# Patient Record
Sex: MALE | Race: WHITE | URBAN - METROPOLITAN AREA
[De-identification: names, ages, dates, MRNs, and addresses within clinical notes are randomized per-mention and may not be internally consistent; named-entity substitution may affect disease eponyms.]

---

## 2018-04-14 ENCOUNTER — APPOINTMENT (OUTPATIENT)
Dept: CT IMAGING | Facility: CLINIC | Age: 83
DRG: 637 | End: 2018-04-14
Attending: INTERNAL MEDICINE
Payer: MEDICARE

## 2018-04-14 ENCOUNTER — APPOINTMENT (OUTPATIENT)
Dept: CT IMAGING | Facility: CLINIC | Age: 83
DRG: 637 | End: 2018-04-14
Attending: EMERGENCY MEDICINE
Payer: MEDICARE

## 2018-04-14 ENCOUNTER — ANESTHESIA EVENT (OUTPATIENT)
Dept: MEDSURG UNIT | Facility: CLINIC | Age: 83
DRG: 637 | End: 2018-04-14
Payer: MEDICARE

## 2018-04-14 ENCOUNTER — HOSPITAL ENCOUNTER (INPATIENT)
Facility: CLINIC | Age: 83
LOS: 5 days | Discharge: HOME HEALTH | DRG: 637 | End: 2018-04-19
Attending: EMERGENCY MEDICINE | Admitting: INTERNAL MEDICINE
Payer: MEDICARE

## 2018-04-14 ENCOUNTER — ANESTHESIA (OUTPATIENT)
Dept: MEDSURG UNIT | Facility: CLINIC | Age: 83
DRG: 637 | End: 2018-04-14
Payer: MEDICARE

## 2018-04-14 ENCOUNTER — APPOINTMENT (OUTPATIENT)
Dept: GENERAL RADIOLOGY | Facility: CLINIC | Age: 83
DRG: 637 | End: 2018-04-14
Attending: EMERGENCY MEDICINE
Payer: MEDICARE

## 2018-04-14 DIAGNOSIS — R73.9 HYPERGLYCEMIA: ICD-10-CM

## 2018-04-14 DIAGNOSIS — R30.0 DYSURIA: ICD-10-CM

## 2018-04-14 DIAGNOSIS — E86.0 DEHYDRATION: ICD-10-CM

## 2018-04-14 DIAGNOSIS — N40.1 BENIGN PROSTATIC HYPERPLASIA WITH LOWER URINARY TRACT SYMPTOMS, SYMPTOM DETAILS UNSPECIFIED: ICD-10-CM

## 2018-04-14 DIAGNOSIS — G93.41 METABOLIC ENCEPHALOPATHY: ICD-10-CM

## 2018-04-14 DIAGNOSIS — E11.00 TYPE 2 DIABETES MELLITUS WITH HYPEROSMOLARITY WITHOUT COMA, WITHOUT LONG-TERM CURRENT USE OF INSULIN (H): Primary | ICD-10-CM

## 2018-04-14 DIAGNOSIS — A04.72 C. DIFFICILE COLITIS: ICD-10-CM

## 2018-04-14 LAB
ALBUMIN SERPL-MCNC: 2.8 G/DL (ref 3.4–5)
ALBUMIN SERPL-MCNC: 3.1 G/DL (ref 3.4–5)
ALBUMIN UR-MCNC: 30 MG/DL
ALP SERPL-CCNC: 146 U/L (ref 40–150)
ALP SERPL-CCNC: 165 U/L (ref 40–150)
ALT SERPL W P-5'-P-CCNC: 33 U/L (ref 0–70)
ALT SERPL W P-5'-P-CCNC: 40 U/L (ref 0–70)
ANION GAP SERPL CALCULATED.3IONS-SCNC: 12 MMOL/L (ref 3–14)
ANION GAP SERPL CALCULATED.3IONS-SCNC: 9 MMOL/L (ref 3–14)
APPEARANCE UR: CLEAR
AST SERPL W P-5'-P-CCNC: 11 U/L (ref 0–45)
AST SERPL W P-5'-P-CCNC: 12 U/L (ref 0–45)
BASE EXCESS BLDV CALC-SCNC: 1.2 MMOL/L
BASOPHILS # BLD AUTO: 0.1 10E9/L (ref 0–0.2)
BASOPHILS NFR BLD AUTO: 0.4 %
BILIRUB DIRECT SERPL-MCNC: 0.3 MG/DL (ref 0–0.2)
BILIRUB SERPL-MCNC: 0.9 MG/DL (ref 0.2–1.3)
BILIRUB SERPL-MCNC: 1 MG/DL (ref 0.2–1.3)
BILIRUB UR QL STRIP: NEGATIVE
BUN SERPL-MCNC: 18 MG/DL (ref 7–30)
BUN SERPL-MCNC: 20 MG/DL (ref 7–30)
CALCIUM SERPL-MCNC: 8.5 MG/DL (ref 8.5–10.1)
CALCIUM SERPL-MCNC: 9.3 MG/DL (ref 8.5–10.1)
CHLORIDE SERPL-SCNC: 110 MMOL/L (ref 94–109)
CHLORIDE SERPL-SCNC: 118 MMOL/L (ref 94–109)
CO2 SERPL-SCNC: 22 MMOL/L (ref 20–32)
CO2 SERPL-SCNC: 24 MMOL/L (ref 20–32)
COLOR UR AUTO: ABNORMAL
CREAT SERPL-MCNC: 1.18 MG/DL (ref 0.66–1.25)
CREAT SERPL-MCNC: 1.33 MG/DL (ref 0.66–1.25)
DIFFERENTIAL METHOD BLD: ABNORMAL
EOSINOPHIL # BLD AUTO: 0 10E9/L (ref 0–0.7)
EOSINOPHIL NFR BLD AUTO: 0.2 %
ERYTHROCYTE [DISTWIDTH] IN BLOOD BY AUTOMATED COUNT: 21.8 % (ref 10–15)
ERYTHROCYTE [DISTWIDTH] IN BLOOD BY AUTOMATED COUNT: 22 % (ref 10–15)
GFR SERPL CREATININE-BSD FRML MDRD: 50 ML/MIN/1.7M2
GFR SERPL CREATININE-BSD FRML MDRD: 58 ML/MIN/1.7M2
GLUCOSE BLDC GLUCOMTR-MCNC: 198 MG/DL (ref 70–99)
GLUCOSE BLDC GLUCOMTR-MCNC: 199 MG/DL (ref 70–99)
GLUCOSE BLDC GLUCOMTR-MCNC: 202 MG/DL (ref 70–99)
GLUCOSE BLDC GLUCOMTR-MCNC: 204 MG/DL (ref 70–99)
GLUCOSE BLDC GLUCOMTR-MCNC: 220 MG/DL (ref 70–99)
GLUCOSE SERPL-MCNC: 242 MG/DL (ref 70–99)
GLUCOSE SERPL-MCNC: 439 MG/DL (ref 70–99)
GLUCOSE UR STRIP-MCNC: >1000 MG/DL
HBA1C MFR BLD: 8.8 % (ref 0–6.4)
HCO3 BLDV-SCNC: 25 MMOL/L (ref 21–28)
HCT VFR BLD AUTO: 36.3 % (ref 40–53)
HCT VFR BLD AUTO: 39.4 % (ref 40–53)
HGB BLD-MCNC: 11.4 G/DL (ref 13.3–17.7)
HGB BLD-MCNC: 12.4 G/DL (ref 13.3–17.7)
HGB UR QL STRIP: ABNORMAL
IMM GRANULOCYTES # BLD: 0.1 10E9/L (ref 0–0.4)
IMM GRANULOCYTES NFR BLD: 0.6 %
INTERPRETATION ECG - MUSE: NORMAL
KETONES BLD-SCNC: 1.6 MMOL/L (ref 0–0.6)
KETONES UR STRIP-MCNC: 40 MG/DL
LACTATE BLD-SCNC: 1.9 MMOL/L (ref 0.7–2)
LACTATE BLD-SCNC: 2.9 MMOL/L (ref 0.7–2)
LEUKOCYTE ESTERASE UR QL STRIP: ABNORMAL
LYMPHOCYTES # BLD AUTO: 1.6 10E9/L (ref 0.8–5.3)
LYMPHOCYTES NFR BLD AUTO: 12.4 %
MAGNESIUM SERPL-MCNC: 1.9 MG/DL (ref 1.6–2.3)
MCH RBC QN AUTO: 24.2 PG (ref 26.5–33)
MCH RBC QN AUTO: 24.4 PG (ref 26.5–33)
MCHC RBC AUTO-ENTMCNC: 31.4 G/DL (ref 31.5–36.5)
MCHC RBC AUTO-ENTMCNC: 31.5 G/DL (ref 31.5–36.5)
MCV RBC AUTO: 77 FL (ref 78–100)
MCV RBC AUTO: 78 FL (ref 78–100)
MONOCYTES # BLD AUTO: 1.1 10E9/L (ref 0–1.3)
MONOCYTES NFR BLD AUTO: 8.2 %
NEUTROPHILS # BLD AUTO: 10.4 10E9/L (ref 1.6–8.3)
NEUTROPHILS NFR BLD AUTO: 78.2 %
NITRATE UR QL: NEGATIVE
NRBC # BLD AUTO: 0 10*3/UL
NRBC BLD AUTO-RTO: 0 /100
OSMOLALITY SERPL: 332 MMOL/KG (ref 280–301)
OXYHGB MFR BLDV: 43 %
PCO2 BLDV: 34 MM HG (ref 40–50)
PH BLDV: 7.47 PH (ref 7.32–7.43)
PH UR STRIP: 7.5 PH (ref 5–7)
PLATELET # BLD AUTO: 352 10E9/L (ref 150–450)
PLATELET # BLD AUTO: 400 10E9/L (ref 150–450)
PO2 BLDV: 25 MM HG (ref 25–47)
POTASSIUM SERPL-SCNC: 3.5 MMOL/L (ref 3.4–5.3)
POTASSIUM SERPL-SCNC: 4.2 MMOL/L (ref 3.4–5.3)
PROCALCITONIN SERPL-MCNC: 0.12 NG/ML
PROT SERPL-MCNC: 6.8 G/DL (ref 6.8–8.8)
PROT SERPL-MCNC: 7.7 G/DL (ref 6.8–8.8)
RBC # BLD AUTO: 4.68 10E12/L (ref 4.4–5.9)
RBC # BLD AUTO: 5.12 10E12/L (ref 4.4–5.9)
RBC #/AREA URNS AUTO: 3 /HPF (ref 0–2)
SODIUM SERPL-SCNC: 144 MMOL/L (ref 133–144)
SODIUM SERPL-SCNC: 151 MMOL/L (ref 133–144)
SOURCE: ABNORMAL
SP GR UR STRIP: 1.02 (ref 1–1.03)
SQUAMOUS #/AREA URNS AUTO: 1 /HPF (ref 0–1)
TROPONIN I SERPL-MCNC: <0.015 UG/L (ref 0–0.04)
UROBILINOGEN UR STRIP-MCNC: NORMAL MG/DL (ref 0–2)
WBC # BLD AUTO: 10.6 10E9/L (ref 4–11)
WBC # BLD AUTO: 13.2 10E9/L (ref 4–11)
WBC #/AREA URNS AUTO: 7 /HPF (ref 0–5)

## 2018-04-14 PROCEDURE — 81001 URINALYSIS AUTO W/SCOPE: CPT | Performed by: EMERGENCY MEDICINE

## 2018-04-14 PROCEDURE — 80053 COMPREHEN METABOLIC PANEL: CPT | Performed by: EMERGENCY MEDICINE

## 2018-04-14 PROCEDURE — 25000131 ZZH RX MED GY IP 250 OP 636 PS 637: Performed by: INTERNAL MEDICINE

## 2018-04-14 PROCEDURE — 74176 CT ABD & PELVIS W/O CONTRAST: CPT

## 2018-04-14 PROCEDURE — 80076 HEPATIC FUNCTION PANEL: CPT | Performed by: INTERNAL MEDICINE

## 2018-04-14 PROCEDURE — 00000146 ZZHCL STATISTIC GLUCOSE BY METER IP

## 2018-04-14 PROCEDURE — 71046 X-RAY EXAM CHEST 2 VIEWS: CPT

## 2018-04-14 PROCEDURE — 84484 ASSAY OF TROPONIN QUANT: CPT | Performed by: EMERGENCY MEDICINE

## 2018-04-14 PROCEDURE — 83036 HEMOGLOBIN GLYCOSYLATED A1C: CPT | Performed by: INTERNAL MEDICINE

## 2018-04-14 PROCEDURE — 25000132 ZZH RX MED GY IP 250 OP 250 PS 637: Performed by: INTERNAL MEDICINE

## 2018-04-14 PROCEDURE — 37000011 ZZH ANESTHESIA WARD SERVICE

## 2018-04-14 PROCEDURE — 83735 ASSAY OF MAGNESIUM: CPT | Performed by: INTERNAL MEDICINE

## 2018-04-14 PROCEDURE — 37000011 ZZH ANESTHESIA WARD SERVICE: Performed by: REGISTERED NURSE

## 2018-04-14 PROCEDURE — 85025 COMPLETE CBC W/AUTO DIFF WBC: CPT | Performed by: EMERGENCY MEDICINE

## 2018-04-14 PROCEDURE — 96374 THER/PROPH/DIAG INJ IV PUSH: CPT

## 2018-04-14 PROCEDURE — 70450 CT HEAD/BRAIN W/O DYE: CPT

## 2018-04-14 PROCEDURE — 85027 COMPLETE CBC AUTOMATED: CPT | Performed by: INTERNAL MEDICINE

## 2018-04-14 PROCEDURE — 36415 COLL VENOUS BLD VENIPUNCTURE: CPT

## 2018-04-14 PROCEDURE — 25000128 H RX IP 250 OP 636: Performed by: INTERNAL MEDICINE

## 2018-04-14 PROCEDURE — 87088 URINE BACTERIA CULTURE: CPT | Performed by: EMERGENCY MEDICINE

## 2018-04-14 PROCEDURE — 83605 ASSAY OF LACTIC ACID: CPT | Performed by: EMERGENCY MEDICINE

## 2018-04-14 PROCEDURE — 99285 EMERGENCY DEPT VISIT HI MDM: CPT | Mod: 25

## 2018-04-14 PROCEDURE — 25000128 H RX IP 250 OP 636: Performed by: EMERGENCY MEDICINE

## 2018-04-14 PROCEDURE — 87040 BLOOD CULTURE FOR BACTERIA: CPT | Performed by: EMERGENCY MEDICINE

## 2018-04-14 PROCEDURE — 82805 BLOOD GASES W/O2 SATURATION: CPT | Performed by: EMERGENCY MEDICINE

## 2018-04-14 PROCEDURE — 12000000 ZZH R&B MED SURG/OB

## 2018-04-14 PROCEDURE — 84145 PROCALCITONIN (PCT): CPT | Performed by: INTERNAL MEDICINE

## 2018-04-14 PROCEDURE — 80048 BASIC METABOLIC PNL TOTAL CA: CPT | Performed by: INTERNAL MEDICINE

## 2018-04-14 PROCEDURE — 36415 COLL VENOUS BLD VENIPUNCTURE: CPT | Performed by: EMERGENCY MEDICINE

## 2018-04-14 PROCEDURE — 82010 KETONE BODYS QUAN: CPT | Performed by: EMERGENCY MEDICINE

## 2018-04-14 PROCEDURE — 87186 SC STD MICRODIL/AGAR DIL: CPT | Performed by: EMERGENCY MEDICINE

## 2018-04-14 PROCEDURE — 36415 COLL VENOUS BLD VENIPUNCTURE: CPT | Performed by: INTERNAL MEDICINE

## 2018-04-14 PROCEDURE — 83930 ASSAY OF BLOOD OSMOLALITY: CPT | Performed by: EMERGENCY MEDICINE

## 2018-04-14 PROCEDURE — 25000131 ZZH RX MED GY IP 250 OP 636 PS 637: Performed by: EMERGENCY MEDICINE

## 2018-04-14 PROCEDURE — 87086 URINE CULTURE/COLONY COUNT: CPT | Performed by: EMERGENCY MEDICINE

## 2018-04-14 PROCEDURE — 93005 ELECTROCARDIOGRAM TRACING: CPT

## 2018-04-14 PROCEDURE — 99223 1ST HOSP IP/OBS HIGH 75: CPT | Mod: AI | Performed by: INTERNAL MEDICINE

## 2018-04-14 PROCEDURE — 40000671 ZZH STATISTIC ANESTHESIA CASE

## 2018-04-14 RX ORDER — MAGNESIUM SULFATE HEPTAHYDRATE 40 MG/ML
4 INJECTION, SOLUTION INTRAVENOUS EVERY 4 HOURS PRN
Status: DISCONTINUED | OUTPATIENT
Start: 2018-04-14 | End: 2018-04-19 | Stop reason: HOSPADM

## 2018-04-14 RX ORDER — SODIUM CHLORIDE 9 MG/ML
INJECTION, SOLUTION INTRAVENOUS CONTINUOUS
Status: DISCONTINUED | OUTPATIENT
Start: 2018-04-14 | End: 2018-04-14

## 2018-04-14 RX ORDER — CEFTRIAXONE 1 G/1
1 INJECTION, POWDER, FOR SOLUTION INTRAMUSCULAR; INTRAVENOUS EVERY 24 HOURS
Status: DISCONTINUED | OUTPATIENT
Start: 2018-04-14 | End: 2018-04-15

## 2018-04-14 RX ORDER — BISACODYL 5 MG
5 TABLET, DELAYED RELEASE (ENTERIC COATED) ORAL DAILY PRN
Status: DISCONTINUED | OUTPATIENT
Start: 2018-04-14 | End: 2018-04-19 | Stop reason: HOSPADM

## 2018-04-14 RX ORDER — DEXTROSE MONOHYDRATE 25 G/50ML
25-50 INJECTION, SOLUTION INTRAVENOUS
Status: DISCONTINUED | OUTPATIENT
Start: 2018-04-14 | End: 2018-04-14

## 2018-04-14 RX ORDER — PROCHLORPERAZINE 25 MG
12.5 SUPPOSITORY, RECTAL RECTAL EVERY 12 HOURS PRN
Status: DISCONTINUED | OUTPATIENT
Start: 2018-04-14 | End: 2018-04-19 | Stop reason: HOSPADM

## 2018-04-14 RX ORDER — METOPROLOL TARTRATE 50 MG
50 TABLET ORAL 2 TIMES DAILY
Status: DISCONTINUED | OUTPATIENT
Start: 2018-04-14 | End: 2018-04-15

## 2018-04-14 RX ORDER — SODIUM CHLORIDE AND POTASSIUM CHLORIDE 150; 450 MG/100ML; MG/100ML
INJECTION, SOLUTION INTRAVENOUS CONTINUOUS
Status: DISCONTINUED | OUTPATIENT
Start: 2018-04-14 | End: 2018-04-17

## 2018-04-14 RX ORDER — POTASSIUM CHLORIDE 29.8 MG/ML
20 INJECTION INTRAVENOUS
Status: DISCONTINUED | OUTPATIENT
Start: 2018-04-14 | End: 2018-04-19 | Stop reason: HOSPADM

## 2018-04-14 RX ORDER — NALOXONE HYDROCHLORIDE 0.4 MG/ML
.1-.4 INJECTION, SOLUTION INTRAMUSCULAR; INTRAVENOUS; SUBCUTANEOUS
Status: DISCONTINUED | OUTPATIENT
Start: 2018-04-14 | End: 2018-04-19 | Stop reason: HOSPADM

## 2018-04-14 RX ORDER — DEXTROSE MONOHYDRATE 25 G/50ML
25-50 INJECTION, SOLUTION INTRAVENOUS
Status: DISCONTINUED | OUTPATIENT
Start: 2018-04-14 | End: 2018-04-15

## 2018-04-14 RX ORDER — POTASSIUM CHLORIDE 1500 MG/1
20-40 TABLET, EXTENDED RELEASE ORAL
Status: DISCONTINUED | OUTPATIENT
Start: 2018-04-14 | End: 2018-04-19 | Stop reason: HOSPADM

## 2018-04-14 RX ORDER — ONDANSETRON 2 MG/ML
4 INJECTION INTRAMUSCULAR; INTRAVENOUS EVERY 6 HOURS PRN
Status: DISCONTINUED | OUTPATIENT
Start: 2018-04-14 | End: 2018-04-19 | Stop reason: HOSPADM

## 2018-04-14 RX ORDER — NICOTINE POLACRILEX 4 MG
15-30 LOZENGE BUCCAL
Status: DISCONTINUED | OUTPATIENT
Start: 2018-04-14 | End: 2018-04-15

## 2018-04-14 RX ORDER — POTASSIUM CL/LIDO/0.9 % NACL 10MEQ/0.1L
10 INTRAVENOUS SOLUTION, PIGGYBACK (ML) INTRAVENOUS
Status: DISCONTINUED | OUTPATIENT
Start: 2018-04-14 | End: 2018-04-19 | Stop reason: HOSPADM

## 2018-04-14 RX ORDER — BUDESONIDE AND FORMOTEROL FUMARATE DIHYDRATE 160; 4.5 UG/1; UG/1
2 AEROSOL RESPIRATORY (INHALATION) 2 TIMES DAILY
Status: DISCONTINUED | OUTPATIENT
Start: 2018-04-14 | End: 2018-04-14 | Stop reason: CLARIF

## 2018-04-14 RX ORDER — ROSUVASTATIN CALCIUM 10 MG/1
10 TABLET, COATED ORAL DAILY
COMMUNITY

## 2018-04-14 RX ORDER — POTASSIUM CHLORIDE 1.5 G/1.58G
20-40 POWDER, FOR SOLUTION ORAL
Status: DISCONTINUED | OUTPATIENT
Start: 2018-04-14 | End: 2018-04-19 | Stop reason: HOSPADM

## 2018-04-14 RX ORDER — NICOTINE POLACRILEX 4 MG
15-30 LOZENGE BUCCAL
Status: DISCONTINUED | OUTPATIENT
Start: 2018-04-14 | End: 2018-04-14

## 2018-04-14 RX ORDER — BISACODYL 5 MG
10 TABLET, DELAYED RELEASE (ENTERIC COATED) ORAL DAILY PRN
Status: DISCONTINUED | OUTPATIENT
Start: 2018-04-14 | End: 2018-04-19 | Stop reason: HOSPADM

## 2018-04-14 RX ORDER — IPRATROPIUM BROMIDE AND ALBUTEROL SULFATE 2.5; .5 MG/3ML; MG/3ML
1 SOLUTION RESPIRATORY (INHALATION) EVERY 6 HOURS PRN
Status: DISCONTINUED | OUTPATIENT
Start: 2018-04-14 | End: 2018-04-19 | Stop reason: HOSPADM

## 2018-04-14 RX ORDER — ACETAMINOPHEN 325 MG/1
650 TABLET ORAL EVERY 4 HOURS PRN
Status: DISCONTINUED | OUTPATIENT
Start: 2018-04-14 | End: 2018-04-19 | Stop reason: HOSPADM

## 2018-04-14 RX ORDER — BISACODYL 5 MG
15 TABLET, DELAYED RELEASE (ENTERIC COATED) ORAL DAILY PRN
Status: DISCONTINUED | OUTPATIENT
Start: 2018-04-14 | End: 2018-04-19 | Stop reason: HOSPADM

## 2018-04-14 RX ORDER — POTASSIUM CHLORIDE 7.45 MG/ML
10 INJECTION INTRAVENOUS
Status: DISCONTINUED | OUTPATIENT
Start: 2018-04-14 | End: 2018-04-19 | Stop reason: HOSPADM

## 2018-04-14 RX ORDER — TRIAMCINOLONE ACETONIDE 0.25 MG/G
OINTMENT TOPICAL 2 TIMES DAILY
COMMUNITY

## 2018-04-14 RX ORDER — PROCHLORPERAZINE MALEATE 5 MG
5 TABLET ORAL EVERY 6 HOURS PRN
Status: DISCONTINUED | OUTPATIENT
Start: 2018-04-14 | End: 2018-04-19 | Stop reason: HOSPADM

## 2018-04-14 RX ORDER — ACETAMINOPHEN 650 MG/1
650 SUPPOSITORY RECTAL EVERY 4 HOURS PRN
Status: DISCONTINUED | OUTPATIENT
Start: 2018-04-14 | End: 2018-04-19 | Stop reason: HOSPADM

## 2018-04-14 RX ORDER — BUDESONIDE AND FORMOTEROL FUMARATE DIHYDRATE 160; 4.5 UG/1; UG/1
2 AEROSOL RESPIRATORY (INHALATION) 2 TIMES DAILY
COMMUNITY

## 2018-04-14 RX ORDER — ONDANSETRON 4 MG/1
4 TABLET, ORALLY DISINTEGRATING ORAL EVERY 6 HOURS PRN
Status: DISCONTINUED | OUTPATIENT
Start: 2018-04-14 | End: 2018-04-19 | Stop reason: HOSPADM

## 2018-04-14 RX ORDER — IPRATROPIUM BROMIDE AND ALBUTEROL SULFATE 2.5; .5 MG/3ML; MG/3ML
1 SOLUTION RESPIRATORY (INHALATION) EVERY 6 HOURS PRN
COMMUNITY

## 2018-04-14 RX ADMIN — SODIUM CHLORIDE 500 ML: 9 INJECTION, SOLUTION INTRAVENOUS at 15:52

## 2018-04-14 RX ADMIN — CEFTRIAXONE 1 G: 1 INJECTION, POWDER, FOR SOLUTION INTRAMUSCULAR; INTRAVENOUS at 17:13

## 2018-04-14 RX ADMIN — SODIUM CHLORIDE 6 UNITS: 9 INJECTION, SOLUTION INTRAVENOUS at 11:51

## 2018-04-14 RX ADMIN — POTASSIUM CHLORIDE AND SODIUM CHLORIDE: 450; 150 INJECTION, SOLUTION INTRAVENOUS at 18:12

## 2018-04-14 RX ADMIN — SODIUM CHLORIDE 1000 ML: 9 INJECTION, SOLUTION INTRAVENOUS at 10:24

## 2018-04-14 RX ADMIN — INSULIN ASPART 2 UNITS: 100 INJECTION, SOLUTION INTRAVENOUS; SUBCUTANEOUS at 19:14

## 2018-04-14 ASSESSMENT — ENCOUNTER SYMPTOMS
WEAKNESS: 1
NAUSEA: 0
FEVER: 0
FATIGUE: 1
VOMITING: 0
SHORTNESS OF BREATH: 1
DYSURIA: 0
ABDOMINAL PAIN: 0

## 2018-04-14 NOTE — LETTER
Transition Communication Hand-off for Care Transitions to Next Level of Care Provider    Name: Scott Alexander MD  : 1925  MRN #: [id number]  Primary Care Provider: Oneal Liu     Primary Clinic: [facility]LEX[facility] [address] [facility] [id number]  [facility] [address]     Reason for Hospitalization:  Dehydration [E86.0]  Confusion [R41.0]  Hyperglycemia [R73.9]  C. difficile colitis [A04.72]  Admit Date/Time: 2018  9:52 AM  Discharge Date: 18  Payor Source: Payor: BC / Plan: [employer] / Product Type: PPO /     Readmission Assessment Measure (HARPER) Risk Score/category: average but escalating to high  Reason for Communication Hand-off Referral: Fragility  Avoidable readmission within 30 days  Concern for non-adherence with plan of care: No     Discharge Needs Assessment:  Needs       Most Recent Value    Equipment Currently Used at Home cane, straight, walker, rolling    Transportation Available family or friend will provide      Already enrolled in Tele-monitoring program and name of program:  no  Follow-up specialty is recommended: Yes    Follow-up plan:  No future appointments.    Any outstanding tests or procedures:        Referrals     Future Labs/Procedures    Home care nursing referral     Comments:    Resume [facility]    Home Care OT Referral for Hospital Discharge     Comments:    OT to eval and treat    Your provider has ordered home care - occupational therapy. If you have not been contacted within 2 days of your discharge please call the department phone number listed on the top of this document.    Home Care PT Referral for Hospital Discharge     Comments:    PT to eval and treat    Your provider has ordered home care - physical therapy. If you have not been contacted within 2 days of your discharge please call the department phone number listed on the top of this document.          Hospital Course : Patient with h/o diabetes  Renal stones UTI( Klebsiela) recurrent  admission for cystitis was admitted for increased weakness lethargy encephalopathy and dehydration. Noted patients blood sugars was markedly elevated on admission .  Patient was treated with antibiotics and insulin regimen initiated during hospital stay  Urology was consulted for retention and non obstructing renal stone. Recommended  conservative treatment..         Md feels  -Home regimen not adequate.  He is not a great candidate to continue metformin given his CKD. Januvia is not adequate. Likely not compliant with his dm diet.   -During his stay he has been maintained on daily pm lantus ~13-15 units along with ISS and low dose scheduled prandial aspart  - Discharged on Lantus at 14 units every morning(per wife request )   -januvia discontinued at discharge - per MD patientt should have a simple DM regimen and this is the wish of the family as well. Pt should be be able to be adequately treated with once daily lantus- probably 13-15 units qd.  Informed wife that this can be increased by ~2 units every 3 days if BS remain consistently >150-160. Given age does not need tight control and this is the wish of the family as well. Goal BS ~130-160.   -Home health care ordered. Therapies feel patient is not safe to return to home considering his level of weakness but family and patients spouse insistent on discharging to home with home care. Patients wife stated she would like to make her own appointment.  Sinai Aleman RN Care coordinator    AVS/Discharge Summary is the source of truth; this is a helpful guide for improved communication of patient story

## 2018-04-14 NOTE — PROGRESS NOTES
RECEIVING UNIT ED HANDOFF REVIEW    ED Nurse Handoff Report was reviewed by: Jonelle Urias on April 14, 2018 at 12:21 PM

## 2018-04-14 NOTE — H&P
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  On admission, pt afebrile,slightly hypertensive. Head cT, cXR both negative. UA shows mild pyuria- pt presents on cefuroxime    /Possible UTI:   UA not have significant pyuria. Presents on diflucan and cefuroxime per his primary ID provider.  Has had some dysuria and flank pain but hx is vague. CT abd without contrast shows 6 mm R renal stone. No ureteral stone or hydronephrosis.  Multiple renal cysts. Slight hematuria.   -hold po abx and antifungal  - start Rocephin  -CT abd- see above results  - bld cx  - urology associates consult.   -fluids  -npo    DM-2/Hyperglycemia  - running in 200 range at home.  Associated dehydration and encephalopathy. Taking januvia 100mg qd at home.  No longer on metformin.    - IVFs  - npo for not as failed bedside RN swallow eval  - ISS for npo status.   -hold on lantus/basal.   - hold januvia    Metabolic Encephalopathy: acute  2/2 to dehydration, possible UTI, hyperglycemia.    - some slurring of words. Unable to obtain mri brain given pacemaker  -ivfs  -am labs  -tx hyperglycemia  -follow lytes  -neuro checks      Failure to thrive/Anorexia  - consider possible persisting urinary tract infection  -abx emperically- presents on abx  -tx hyperglycemia  - nutrition consult  -albumin  -PT, OT  -likely needs TCU.     Dehydration:   2/2 to hyperglycemia, poor po intake   -ivfs    Hypernatremia: Na 144 on presentation. Increased to 151 post fluid bolus. Calculated free water deficit is 2.7 liters. Replace 1/2 free water in first 24 hours. 1/2 NS given hyperglycemia    DVT Prophylaxis: mechanical prophylaxis. Change to lovenox or sq heparin if know no urologic procedure not needed.      Code Status: Full Code- discussed with pt wife    Disposition: Expected discharge in >2 days    Naga Denney MD    Primary Care Physician *Oneal Liu    Chief Complaint   High blood sugar of 481. 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  Per his wife blood sugar was 481 this am.  He checks his blood sugars ~1 time daily. For the last 2 weeks blood sugars have been in the 200 range. He is no longer taking metformin but has been taking Januvia 100mg qd.     From a urologic standpoint patient has the following recent medical hx:   -history of proximal ureteral stone with klebsiella UTI and spticemia 1/18/18 (underwent percutaneous nephrostomy and subsequent removal of stent 2/20  -admitted to [facility] from 3/19-3/21/18 for left ureteral stone and possible left pyelonephritis. He was treated with IV isotonic fluids and ciprofloxacin. He underwent a cystoscopy on 3/20 with left ureteral stent placed. Urine culture negative, however antibiotics were continued.  completed a 10 day course of ciprofloxacin on 3/28/18  -Readmitted to [facility] hospital 3/31-4/3:  Renal US showed no hydronephrosis. Dr. Lambert of urology performed Cystoscopy, left ureteroscopy with laser lithotripsy and stone removal, left stent removal 4/1/18. Urine cultured grew Candida and he will be treated with 1 week of fluconazole per Dr. Zuniga of ID.     Pt states he has been feeling weak and has had a poor appetite for several days. He has had some dysuria but not over the last few days. He has had urinary frequency since dc from hospital.  He denies other symptoms. Per his wife, he has become steadily weaker and having worsening anorexia since discharge from [facility]  hospital for cystitis on 4/3/18.  His po intake as worsened even further in the last 3 days.  He was seen by his primary infectious disease MD on 4/11/18. UA grossly abnormal and he was started on Cefuroxime 250mg bid for 10 days and his fluconazole form his [facility] hospital dc was extended for another 2 weeks. His ucx ngtd.  He has not had diarrhea and no dx or treatment for Cdiff.    He is followed by dr. Lambert of urology outpatient. He has seen his primary MD since his discharge but no clear further evaluation done.    Per his wife he has not had fever, chills, n/v/d/abd pain, hematuria, flank pain.      With regard to his HTN, he has been off his metoprolol 50mg bid since his discharge from [facility] since per wife he was told to stop this but dc summary has this on medication list.       Past Medical History    I have reviewed this patient's medical history and updated it with pertinent information if needed.   Past Medical History:   Diagnosis Date     Benign prostatic hypertrophy without urinary obstruction      Cardiac pacemaker      Cardiomyopathy      Cardiovascular disease      CHF (congestive heart failure) (H)     EF 30%     DM (diabetes mellitus) with complications (H)      Fatigue      History of angina pectoris      History of gastrointestinal bleeding      Hyperlipidemia LDL goal <70      Left bundle branch block      Other urinary incontinence 5/6/2012     Prostate cancers      Shortness of breath      Tremor, essential      Vitamin D deficiencies     chronic       Past Surgical History   I have reviewed this patient's surgical history and updated it with pertinent information if needed.  Past Surgical History:   Procedure Laterality Date     CATARACT IOL, RT/LT Left 08/28/14     CATARACT IOL, RT/LT Right      IMPLANT PACEMAKER       KNEE SURGERY         Prior to Admission Medications   Prior to Admission Medications   Prescriptions Last Dose Informant Patient Reported? Taking?   CEFUROXIME AXETIL  PO  Pharmacy Yes Yes   Sig: Take 250 mg by mouth 2 times daily    FLUCONAZOLE PO  Pharmacy Yes Yes   Sig: Take 200 mg by mouth daily    METOPROLOL TARTRATE PO  Pharmacy Yes Yes   Sig: Take 50 mg by mouth 2 times daily   POTASSIUM CITRATE PO  Pharmacy Yes Yes   Sig: Take 10 mEq by mouth 3 times daily (with meals)    budesonide-formoterol (SYMBICORT) 160-4.5 MCG/ACT Inhaler  Pharmacy Yes No   Sig: Inhale 2 puffs into the lungs 2 times daily   ipratropium - albuterol 0.5 mg/2.5 mg/3 mL (DUONEB) 0.5-2.5 (3) MG/3ML neb solution  Pharmacy Yes No   Sig: Take 1 vial by nebulization every 6 hours as needed for shortness of breath / dyspnea or wheezing   metFORMIN (GLUCOPHAGE) 1000 MG tablet  Self Yes No   Sig: Take 1,000 mg by mouth 2 times daily (with meals).   rosuvastatin (CRESTOR) 10 MG tablet 4/13/2018 at pm Pharmacy Yes Yes   Sig: Take 10 mg by mouth daily   sitagliptin (JANUVIA) 100 MG tablet  Self Yes No   Sig: Take 100 mg by mouth daily   triamcinolone (KENALOG) 0.025 % ointment  Pharmacy Yes Yes   Sig: Apply topically 2 times daily      Facility-Administered Medications: None     Allergies   Allergies   Allergen Reactions     Metformin Other (See Comments)     Metabolic acidosis     Diatrizoate Other (See Comments)     Ceftriaxone Diarrhea     severe       Social History   Full code  Lives at home with wife. + home care    Family History   Patient is unable to provide given acute confusion/encephalopathy    Review of Systems   The 10 point Review of Systems is negative other than noted in the HPI or here.     Physical Exam   Temp: 97.9  F (36.6  C) Temp src: Axillary BP: 161/83 Pulse: 92 Heart Rate: 89 Resp: 24 SpO2: 98 % O2 Device: None (Room air)    Vital Signs with Ranges  Temp:  [97.9  F (36.6  C)-98.2  F (36.8  C)] 97.9  F (36.6  C)  Pulse:  [92] 92  Heart Rate:  [81-89] 89  Resp:  [20-38] 24  BP: (152-173)/(81-97) 161/83  SpO2:  [94 %-98 %] 98 %  0 lbs 0 oz    Constitutional: nad, appears comfortable  Eyes:  eomi, perrla  HEENT: nl op but dry MM  Respiratory: cTAB  Cardiovascular: RRR, no r/g/m  GI: soft, NT ND, no hepatosplenomegaly  Lymph/Hematologic: no axillary, inguinal, cervical lad  Genitourinary: nl testis and penis,   Skin: no rash, echymosis on knees  Musculoskeletal: no focal swelling or redness or pain with rom  Neurologic: disoriented to place, year, date. Oriented to name. [place].  intermitently slurred speech, occasional nonsensical answers to questions. Answers some questions approrpriately  Tremor of head and arms, strength 5/5 extrem X 4. Babinski equivical. Face symmetric, tongue midline. Nl naming. Nl speech when repeats sentence  Psychiatric: calm, cooperative.     Addendum: speech more clear this evening. More alert, and coherent in mentation.  Oriented to place but not date.   Data   Data reviewed today:  I personally reviewed head ct, cxr.     Recent Labs  Lab 04/14/18  1020   WBC 13.2*   HGB 12.4*   MCV 77*         POTASSIUM 4.2   CHLORIDE 110*   CO2 22   BUN 20   CR 1.33*   ANIONGAP 12   GURU 9.3   *   ALBUMIN 3.1*   PROTTOTAL 7.7   BILITOTAL 1.0   ALKPHOS 165*   ALT 40   AST 11   TROPI <0.015       Imaging:  Recent Results (from the past 24 hour(s))   XR Chest 2 Views    Narrative    CHEST TWO VIEWS  4/14/2018 10:57 AM     HISTORY: Fever, weakness.       Impression    IMPRESSION: PA and lateral views of the chest. Lungs are clear. Heart  is normal in size. No effusions are evident. No pneumothorax.  Implantable cardiac device and three leads appear in good position.    MARC RAZO MD   CT Head w/o Contrast    Narrative    CT OF THE HEAD WITHOUT CONTRAST 4/14/2018 1:06 PM     COMPARISON: None.    HISTORY: Confusion.     TECHNIQUE: 5 mm thick axial CT images of the head were acquired  without IV contrast material.    FINDINGS:  There is moderate diffuse cerebral volume loss. There are  extensive confluent areas of decreased density in the cerebral white  matter bilaterally that  are consistent with sequela of chronic small  vessel ischemic disease.     The ventricles and basal cisterns are within normal limits in  configuration given the degree of cerebral volume loss.  There is no  midline shift. There are no extra-axial fluid collections.     No intracranial hemorrhage, mass or recent infarct.    The visualized paranasal sinuses are well aerated. There is no  mastoiditis. There are no fractures of the visualized bones.       Impression    IMPRESSION:  Diffuse cerebral volume loss and cerebral white matter  changes consistent with chronic small vessel ischemic disease. No  evidence for acute intracranial pathology.     Radiation dose for this scan was reduced using automated exposure  control, adjustment of the mA and/or kV according to patient size, or  iterative reconstruction technique.    CARL SOLITARIO MD

## 2018-04-14 NOTE — ED NOTES
[facility]  ED Nurse Handoff Report    ED Chief complaint: Generalized Weakness (pt not feeling well yesterday and today called 911  and some slight confusion medics were unsure if that is his baselin.  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 difficile colitis - recent history       Code Status: Per hospitalist    Allergies:   Allergies   Allergen Reactions     Metformin Other (See Comments)     Metabolic acidosis     Diatrizoate Other (See Comments)     Ceftriaxone Diarrhea     severe       Activity level - Baseline/Home:  Unable to Assess    Activity Level - Current:   Unable to Assess     Needed?: No    Isolation: Yes  Infection: C-Diff     Bariatric?: No    Vital Signs:   Vitals:    04/14/18 1000 04/14/18 1030 04/14/18 1100 04/14/18 1220   BP:   (!) 162/96 (!) 173/97   Pulse:       Resp:    (!) 38   Temp:       SpO2: 98% 97% 94% 98%       Cardiac Rhythm: ,   Cardiac  Cardiac Rhythm: Ventricular paced    Pain level: 0-10 Pain Scale: 0    Is this patient confused?: Yes     Patient Report: Initial Complaint: Scott Alexander is a 89 year old male with a history of prostate cancer, diabetes, CHF, and cardiomyopathy with pacemaker in place who presents to the ED by ambulance from his home because he is feeling under the weather, weak and fatigued. The patient was diagnosed April 10th (four days ago) with a bladder infection at [facility] and discharged home. On chart review, he also is noted to have a ureteral stent removed April 1st for a kidney stone. The patient reports he started feeling ill last night and became weak, fatigued and a little short of breath. He denies any recent falls and has not hit his head. He denies fever, dysuria, nausea, vomiting, abdominal pain or chest pain. Of note, the patient recently stopped his Metformin for borderline renal function. He is taking Januvia  for his diabetes. EMS reports his blood sugar en route was measured over 400.  Focused Assessment: Resp: WNL Cardiac: WNL paced on monitor. Neuro: Quite confused, per wife pt is not normally confused. Needs a lot of direction to get pt to follow commands. Increased weakness.  Tests Performed: basic labs, lactic, UA and culture, head CT, cxr, trop  Abnormal Results:   Results for orders placed or performed during the hospital encounter of 04/14/18 (from the past 24 hour(s))   EKG 12-lead, tracing only   Result Value Ref Range    Interpretation ECG Click View Image link to view waveform and result    CBC with platelets differential   Result Value Ref Range    WBC 13.2 (H) 4.0 - 11.0 10e9/L    RBC Count 5.12 4.4 - 5.9 10e12/L    Hemoglobin 12.4 (L) 13.3 - 17.7 g/dL    Hematocrit 39.4 (L) 40.0 - 53.0 %    MCV 77 (L) 78 - 100 fl    MCH 24.2 (L) 26.5 - 33.0 pg    MCHC 31.5 31.5 - 36.5 g/dL    RDW 21.8 (H) 10.0 - 15.0 %    Platelet Count 400 150 - 450 10e9/L    Diff Method Automated Method     % Neutrophils 78.2 %    % Lymphocytes 12.4 %    % Monocytes 8.2 %    % Eosinophils 0.2 %    % Basophils 0.4 %    % Immature Granulocytes 0.6 %    Nucleated RBCs 0 0 /100    Absolute Neutrophil 10.4 (H) 1.6 - 8.3 10e9/L    Absolute Lymphocytes 1.6 0.8 - 5.3 10e9/L    Absolute Monocytes 1.1 0.0 - 1.3 10e9/L    Absolute Eosinophils 0.0 0.0 - 0.7 10e9/L    Absolute Basophils 0.1 0.0 - 0.2 10e9/L    Abs Immature Granulocytes 0.1 0 - 0.4 10e9/L    Absolute Nucleated RBC 0.0    Comprehensive metabolic panel   Result Value Ref Range    Sodium 144 133 - 144 mmol/L    Potassium 4.2 3.4 - 5.3 mmol/L    Chloride 110 (H) 94 - 109 mmol/L    Carbon Dioxide 22 20 - 32 mmol/L    Anion Gap 12 3 - 14 mmol/L    Glucose 439 (H) 70 - 99 mg/dL    Urea Nitrogen 20 7 - 30 mg/dL    Creatinine 1.33 (H) 0.66 - 1.25 mg/dL    GFR Estimate 50 (L) >60 mL/min/1.7m2    GFR Estimate If Black 61 >60 mL/min/1.7m2    Calcium 9.3 8.5 - 10.1 mg/dL    Bilirubin Total 1.0  0.2 - 1.3 mg/dL    Albumin 3.1 (L) 3.4 - 5.0 g/dL    Protein Total 7.7 6.8 - 8.8 g/dL    Alkaline Phosphatase 165 (H) 40 - 150 U/L    ALT 40 0 - 70 U/L    AST 11 0 - 45 U/L   Lactic acid whole blood   Result Value Ref Range    Lactic Acid 2.9 (H) 0.7 - 2.0 mmol/L   Troponin I   Result Value Ref Range    Troponin I ES <0.015 0.000 - 0.045 ug/L   Ketone Beta-Hydroxybutyrate Quantitative   Result Value Ref Range    Ketone Quantitative 1.6 (HH) 0.0 - 0.6 mmol/L   Osmolality   Result Value Ref Range    Osmolality 332 (H) 280 - 301 mmol/kg   UA reflex to Microscopic and Culture   Result Value Ref Range    Color Urine Light Yellow     Appearance Urine Clear     Glucose Urine >1000 (A) NEG^Negative mg/dL    Bilirubin Urine Negative NEG^Negative    Ketones Urine 40 (A) NEG^Negative mg/dL    Specific Gravity Urine 1.022 1.003 - 1.035    Blood Urine Trace (A) NEG^Negative    pH Urine 7.5 (H) 5.0 - 7.0 pH    Protein Albumin Urine 30 (A) NEG^Negative mg/dL    Urobilinogen mg/dL Normal 0.0 - 2.0 mg/dL    Nitrite Urine Negative NEG^Negative    Leukocyte Esterase Urine Trace (A) NEG^Negative    Source Midstream Urine     RBC Urine 3 (H) 0 - 2 /HPF    WBC Urine 7 (H) 0 - 5 /HPF    Squamous Epithelial /HPF Urine 1 0 - 1 /HPF   XR Chest 2 Views    Narrative    CHEST TWO VIEWS  4/14/2018 10:57 AM     HISTORY: Fever, weakness.       Impression    IMPRESSION: PA and lateral views of the chest. Lungs are clear. Heart  is normal in size. No effusions are evident. No pneumothorax.  Implantable cardiac device and three leads appear in good position.    MARC RAZO MD   Blood gas venous and oxyhgb   Result Value Ref Range    Ph Venous 7.47 (H) 7.32 - 7.43 pH    PCO2 Venous 34 (L) 40 - 50 mm Hg    PO2 Venous 25 25 - 47 mm Hg    Bicarbonate Venous 25 21 - 28 mmol/L    Oxyhemoglobin Venous 43 %    Base Excess Venous 1.2 mmol/L   Lactic acid   Result Value Ref Range    Lactic Acid 1.9 0.7 - 2.0 mmol/L       Treatments provided: 6units IV insulin,  2L fluids    Family Comments: none here    OBS brochure/video discussed/provided to patient: N/A    ED Medications:   Medications   0.9% sodium chloride BOLUS (1,000 mLs Intravenous New Bag 4/14/18 1024)   insulin (regular) (HumuLIN R/NovoLIN R) injection 6 Units (6 Units Intravenous Given 4/14/18 1151)       Drips infusing?:  No    For the majority of the shift this patient was Green.   Interventions performed were none.    Severe Sepsis OR Septic Shock Diagnosis Present: No      ED NURSE PHONE NUMBER: [phone]

## 2018-04-14 NOTE — IP AVS SNAPSHOT
"[place] [address] [facility]: [phone]                                              INTERAGENCY TRANSFER FORM - PHYSICIAN ORDERS   2018                    Hospital Admission Date: 2018  REGINA CABALLERO MD   : 1925  Sex: Male        Attending Provider: Naga Denney MD     Allergies:  Metformin, Diatrizoate, Ceftriaxone    Infection:  None   Service:  INTERNAL MED    Ht:  1.727 m (5' 8\")   Wt:  58.2 kg (128 lb 4.9 oz)   Admission Wt:  58.9 kg (129 lb 13.6 oz)    BMI:  19.51 kg/m 2   BSA:  1.67 m 2            Patient PCP Information     Provider PCP Type    Oneal Liu MD General      ED Clinical Impression     Diagnosis Description Comment Added By Time Added    Hyperglycemia [R73.9] Hyperglycemia [R73.9]  Julianne Foote MD 2018 12:12 PM    Dehydration [E86.0] Dehydration [E86.0]  Julianne Foote MD 2018 12:12 PM    C. difficile colitis [A04.72] C. difficile colitis - recent history  Julianne Foote MD 2018 12:13 PM      Hospital Problems as of 2018              Priority Class Noted POA    * (Principal)Dehydration Medium  2018 Yes    Hyperglycemia Medium  2018 Yes    Acute encephalopathy Medium  2018 Yes    Dysuria Medium  2018 Yes    Hypernatremia Medium  2018 Yes      Non-Hospital Problems as of 2018              Priority Class Noted    Other urinary incontinence Medium  2012    Macular puckering of retina Medium  10/7/2014    Senile nuclear sclerosis (CATARACT) Medium  10/7/2014      Code Status History     Date Active Date Inactive Code Status Order ID Comments User Context    2018 12:38 PM  Full Code [id number]  Richard Moore MD Outpatient    2018  3:12 PM 2018 12:38 PM Full Code [id number]  Naga Denney MD Inpatient         Medication Review      START taking        Dose / Directions Comments    alcohol swab prep pads   Used for:  Type 2 diabetes mellitus with " hyperosmolarity without coma, without long-term current use of insulin (H)        Use to swab area of injection/elvin as directed.   Quantity:  100 each   Refills:  3        amoxicillin 500 MG tablet   Commonly known as:  AMOXIL   Indication:  Urinary Tract Infection, Enterococcus        Dose:  500 mg   Take 1 tablet (500 mg) by mouth 3 times daily   Quantity:  6 tablet   Refills:  0        blood glucose calibration solution   Commonly known as:  NO BRAND SPECIFIED   Used for:  Type 2 diabetes mellitus with hyperosmolarity without coma, without long-term current use of insulin (H)        Use to calibrate blood glucose monitor as needed as directed.   Quantity:  1 Bottle   Refills:  3    To accompany: Glucometer per insurance.       blood glucose monitoring meter device kit   Commonly known as:  no brand specified   Used for:  Type 2 diabetes mellitus with hyperosmolarity without coma, without long-term current use of insulin (H)        Use to test blood sugar 4 times daily or as directed.   Quantity:  1 kit   Refills:  0    Preferred blood glucose meter OR supplies to accompany: glucometer per insurance       blood glucose monitoring test strip   Commonly known as:  no brand specified   Used for:  Type 2 diabetes mellitus with hyperosmolarity without coma, without long-term current use of insulin (H)        Use to test blood sugar 2-3 times daily or as directed.   Quantity:  100 strip   Refills:  6    To accompany: glucometer per insurance.       glucagon 1 MG Solr injection   Used for:  Type 2 diabetes mellitus with hyperosmolarity without coma, without long-term current use of insulin (H)        Dose:  1 mg   Inject 1 mg Subcutaneous every 15 minutes as needed for low blood sugar (May repeat x 1 only)   Quantity:  10 mg   Refills:  0    Future refills by PCP Dr. Oneal Liu with phone number [phone].       insulin glargine 100 UNIT/ML injection   Commonly known as:  LANTUS   Used for:  Type 2 diabetes  mellitus with hyperosmolarity without coma, without long-term current use of insulin (H)        Dose:  14 Units   Inject 14 Units Subcutaneous every morning   Quantity:  3 mL   Refills:  0        multivitamin, therapeutic with minerals Tabs tablet   Used for:  Type 2 diabetes mellitus with hyperosmolarity without coma, without long-term current use of insulin (H)        Dose:  1 tablet   Take 1 tablet by mouth daily   Quantity:  30 each   Refills:  0        tamsulosin 0.4 MG capsule   Commonly known as:  FLOMAX   Used for:  Benign prostatic hyperplasia with lower urinary tract symptoms, symptom details unspecified        Dose:  0.4 mg   Take 1 capsule (0.4 mg) by mouth daily   Quantity:  60 capsule   Refills:  0    Future refills by PCP Dr. Oneal Liu with phone number [phone].       thin lancets   Commonly known as:  NO BRAND SPECIFIED   Used for:  Type 2 diabetes mellitus with hyperosmolarity without coma, without long-term current use of insulin (H)        Use with lanceting device.   Quantity:  90 each   Refills:  1    To accompany: per insurance.         CONTINUE these medications which have NOT CHANGED        Dose / Directions Comments    budesonide-formoterol 160-4.5 MCG/ACT Inhaler   Commonly known as:  SYMBICORT        Dose:  2 puff   Inhale 2 puffs into the lungs 2 times daily   Refills:  0        ipratropium - albuterol 0.5 mg/2.5 mg/3 mL 0.5-2.5 (3) MG/3ML neb solution   Commonly known as:  DUONEB        Dose:  1 vial   Take 1 vial by nebulization every 6 hours as needed for shortness of breath / dyspnea or wheezing   Refills:  0        METOPROLOL TARTRATE PO        Dose:  50 mg   Take 50 mg by mouth 2 times daily   Refills:  0        POTASSIUM CITRATE PO        Dose:  10 mEq   Take 10 mEq by mouth 3 times daily (with meals)   Refills:  0        rosuvastatin 10 MG tablet   Commonly known as:  CRESTOR        Dose:  10 mg   Take 10 mg by mouth daily   Refills:  0        triamcinolone 0.025 %  ointment   Commonly known as:  KENALOG        Apply topically 2 times daily   Refills:  0          STOP taking     CEFUROXIME AXETIL PO           FLUCONAZOLE PO           sitagliptin 100 MG tablet   Commonly known as:  JANUVIA                     Further instructions from your care team       [facility] to provide home RN and PT. Their phone is [phone].      Instructions on use of Insulin pen:    * Do a fasting blood sugar every morning prior to giving insulin*      1.  Wash hands.  Take cap off and swab top of pen with alcohol to clean  2.  Removed paper from needle and attach needle by screwing on.  3.  Remove the outer cap and the inner blue cap to expose needle.    4.  Dial dose to 2 and push the button on the end to prime needle; you should see it coming out of needle  5.  Dial dose (currently 14 units)  6.  Clean area on abdomen with alcohol wipe (stay away from umbilicus by 2 inches and any bruises or scars)  5.  Insert needle straight into skin and hold needle in.  Push button on end all the way (til it reads 0)      Hold at least 6 seconds then remove.    6. Unscrew needle to remove and dispose in a sharps container.    7. Recap the pen to keep the insulin protected from light.    **CHECK BLOOD SUGAR TWICE A DAY.    **ALWAYS CHECK A MORNING FASTING SUGAR THEN VARY THE OTHER TIMES TO BEFORE LUNCH OR BEFORE DINNER.          Summary of Visit     Reason for your hospital stay       Acute encephalopathy             After Care     Activity       Your activity upon discharge: activity as tolerated       Diet       Follow this diet upon discharge: Orders Placed This Encounter      Snacks/Supplements Adult: Other - Please comment; Strawberry Plus2 shakes; Between Meals      Moderate Consistent CHO Diet               Referrals     Home Care OT Referral for Hospital Discharge       OT to eval and treat    Your provider has ordered home care - occupational therapy. If you have not been contacted  within 2 days of your discharge please call the department phone number listed on the top of this document.       Home Care PT Referral for Hospital Discharge       PT to eval and treat    Your provider has ordered home care - physical therapy. If you have not been contacted within 2 days of your discharge please call the department phone number listed on the top of this document.       Home care nursing referral       Resume [facility]             Follow-Up Appointment Instructions     Future Labs/Procedures    Follow-up and recommended labs and tests     Comments:    Follow up with primary care provider, Oneal Liu, within 7 days for hospital follow- up.      Follow-Up Appointment Instructions     Follow-up and recommended labs and tests       Follow up with primary care provider, Oneal Liu, within 7 days for hospital follow- up.             Statement of Approval     Ordered          04/19/18 1242  I have reviewed and agree with all the recommendations and orders detailed in this document.  EFFECTIVE NOW     Approved and electronically signed by:  Richard Moore MD

## 2018-04-14 NOTE — ED PROVIDER NOTES
History     Chief Complaint:  Weakness and fatigue    HPI   Scott Alexander is a 89 year old male with a history of prostate cancer, diabetes, CHF, and cardiomyopathy with pacemaker in place who presents to the ED by ambulance from his home because he has been feeling weak and fatigued. The patient was diagnosed April 10th (four days ago) with a bladder infection at [facility] and discharged home. On chart review, he also is noted to have a ureteral stent removed April 1st for a kidney stone. The patient reports he started feeling ill last night and became weak, fatigued and a little short of breath. He denies any recent falls and has not hit his head. He denies fever, dysuria, nausea, vomiting, abdominal pain or chest pain. Of note, the patient recently stopped his Metformin for borderline renal function and has been taking Januvia for his diabetes. EMS reports his blood sugar en route was measured over 400.    Allergies:  No known drug allergies    Medications:    Intron  Januvia  Metformin recently stopped for borderline renal function    Past Medical History:    Diabetes  Cardiomyopathy  BPH  Macular puckering of retina  Senile nuclear sclerosis  Urinary incontinence  CHF  Angina Pectoris  GI bleed  Hyperlipidemia  Left bundle branch block  Shortness of breath  Tremor  Vitamin D deficiencies  Cardiovascular disease  Kidney stone    Past Surgical History:    Cataract  Implant pacemaker  Knee surgery    Family History:    Diabetes- brother    Social History:  The patient was brought to the ED by ambulance.  Smoking Status: Never  Smokeless Tobacco: Never  Alcohol Use: Yes  Marital Status:     The patient is a retired Cardiologist from [employer]  The patient lives at home independently with his wife    Review of Systems   Constitutional: Positive for fatigue. Negative for fever.   Respiratory: Positive for shortness of breath.    Cardiovascular: Negative for chest pain.   Gastrointestinal: Negative for abdominal  pain, nausea and vomiting.   Genitourinary: Negative for dysuria.   Neurological: Positive for weakness.   All other systems reviewed and are negative.    Physical Exam   First Vitals:  Patient Vitals for the past 24 hrs:   BP Temp Pulse Heart Rate Resp SpO2   04/14/18 1220 (!) 173/97 - - 89 (!) 38 98 %   04/14/18 1100 (!) 162/96 - - - - 94 %   04/14/18 1030 - - - - - 97 %   04/14/18 1000 - - - - - 98 %   04/14/18 0958 (!) 152/91 98  F (36.7  C) 92 - 20 98 %     Physical Exam  General: Patient is alert and mildly confused.  HEENT: Head atraumatic    Eyes: pupils equal and reactive. Conjunctiva clear   Nares: patent   Oropharynx:dry mucous membranes no lesions, uvula midline, no palatal draping, normal voice, no trismus  Neck: Supple without lymphadenopathy, no meningismus  Chest: Heart regular rate and rhythm.   Lungs: Equal clear to auscultation with no wheeze or rales  Abdomen: Soft, non tender, nondistended, normal bowel sounds  Back: No costovertebral angle tenderness, no midline C, T or L spine tenderness  Neuro: Grossly nonfocal, normal speech, strength equal bilaterally, CN 2-12 intact  Extremities: No deformities, equal radial and DP pulses. No clubbing, cyanosis.  No edema  Skin: Warm and dry with no rash.       Emergency Department Course   ECG done at 1014. ECG read at 1020. Indication: Weakness  Rate 93 bpm. MO interval 118. QRS duration 114. QT/QTc 494/614. P-R-T axes * 46 150.  Atrial-paced ventricular-paced rhythm.  Abnormal ECG.    Imaging:  Radiographic findings were communicated with the patient who voiced understanding of the findings.    XR Chest 2 Views:  IMPRESSION: PA and lateral views of the chest. Lungs are clear. Heart  is normal in size. No effusions are evident. No pneumothorax.  Implantable cardiac device and three leads appear in good position.  Preliminary result, per Radiology.    CT Head w/o Contrast:  IMPRESSION:  Diffuse cerebral volume loss and cerebral white matter  changes  consistent with chronic small vessel ischemic disease. No  evidence for acute intracranial pathology.   Preliminary result, per Radiology.    Laboratory:  CBC: WBC 13.2(H), HGB 12.4(L),   CMP: Chloride 110(H), Glucose 439(H), Creat 1.33(H), GFR 50(L), Albumin 3.1(L), Alkphos 165(H) o/w WNL    Troponin I: <0.015  Ketone  Beta-Hydroxybutyrate Quant: 1.6(HH)  Osmolarity: 332(H)  Blood cultures pending  Blood Gas Venous and Oxyhgb: pH Venous 7.47(H), PCO2 Venous 34(L), PO2 Venous 25, Bicarbonate Venous 25, Oxyhgb Venous 43    Initial Lactic Acid Whole Blood: 2.9(H)  Repeat Lactic Acid  (result time 1210): 1.9    UA: Glc >1000, Ketone 40, Blood trace, pH 7.5(H), Albumin 30, Leukocyte esterase trace, RBC 3(H), WBC 7(H) o/w negative    Interventions:  1005 NS Bolus IV    1106 Insulin injection 6 Units    Emergency Department Course:  Nursing notes and vitals reviewed.  I performed an exam of the patient as documented above.     1110 I rechecked the patient and informed him of his lab results.    1209 Findings and plan explained to the patient who consents to admission. Discussed the patient with Dr. Denney, who will admit the patient to a [facility] bed for further monitoring, evaluation, and treatment.      Impression & Plan       Medical Decision Making:  Scott Alexander is a 89 year old male resents the emergency department complaining of generalized weakness and feeling unwell.  Patient had recent kidney stone requiring stent and a complication of C. difficile colitis related to antibiotics use during that time.  Also with that illness he had Klebsiella septicemia.  On arrival here patient had mild confusion he denied any recent falls or head injury.  He was found to be hyperglycemic blood sugar at home this morning was 481 and it is 439 here on arrival.  His ketones are elevated at 1.6 but he is not acidotic on VBG.  Lactic acid is elevated but I feel this is likely related to starvation ketoacidosis and  dehydration rather than infection or sepsis.  He does have a mild elevation of his white blood cell count but no fever or localizing infectious type symptoms.  His wife states his confusion is new for him thankfully CT is negative for acute intracranial cause.  Do not feel this is consistent with meningitis at this time.  I feel his confusion is likely metabolic related to his dehydration and hyperglycemia.  If symptoms worsen or fail to improve lumbar puncture could be considered while inpatient.  He was given IV fluids here in the emergency department with improvement in his lactic acid in addition insulin was provided with improvement in his blood sugar.  Chest x-ray is negative for acute infiltrate or pneumothorax.  Urinalysis showed no significant urinary tract infection but urine culture is pending.  Blood cultures are pending as well.  Patient's wife come to the plan for admission.  We will plan to address his metabolic derangements and dehydration and ensure that his mental status is improving with these treatments.  Do not suspect acute coronary syndrome EKG is paced but troponin is negative.  Patient and family are comfortable with the plan and all questions and concerns addressed.    Diagnosis:    ICD-10-CM    1. Hyperglycemia R73.9 Urine Culture   2. Dehydration E86.0    3. Confusion R41.0    4. C. difficile colitis - recent history A04.72        Disposition:  The patient will be admitted to the hospital.    4/14/2018    EMERGENCY DEPARTMENT    I, Racheal Johnson, am serving as a scribe at 1001 on April 14, 2018  to document services personally performed by Dr. Foote, based on my observations and the provider's statements to me.       Julianne Foote MD  04/14/18 1400

## 2018-04-14 NOTE — IP AVS SNAPSHOT
MRN:[id number]                      After Visit Summary   4/14/2018    Scott Alexander MD    MRN: [id number]           Thank you!     Thank you for choosing [facility] for your care. Our goal is always to provide you with excellent care. Hearing back from our patients is one way we can continue to improve our services. Please take a few minutes to complete the written survey that you may receive in the mail after you visit with us. Thank you!        Patient Information     Date Of Birth          8/14/1925        Designated Caregiver       Most Recent Value    Caregiver    Will someone help with your care after discharge? yes    Name of designated caregiver kael    Phone number of caregiver [phone]    Caregiver address same as pt      About your hospital stay     You were admitted on:  April 14, 2018 You last received care in the:  [place] [facility] [address] [facility]    You were discharged on:  April 19, 2018        Reason for your hospital stay       Acute encephalopathy                  Who to Call     For medical emergencies, please call 911.  For non-urgent questions about your medical care, please call your primary care provider or clinic, [phone]          Attending Provider     Provider Specialty    Julianne Foote MD Emergency Medicine    [facility], Naga Huggins MD Internal Medicine       Primary Care Provider Office Phone # Fax #    Oneal Liu [phone]      After Care Instructions     Activity       Your activity upon discharge: activity as tolerated            Diet       Follow this diet upon discharge: Orders Placed This Encounter      Snacks/Supplements Adult: Other - Please comment; Strawberry Plus2 shakes; Between Meals      Moderate Consistent CHO Diet                    Follow-up Appointments     Follow-up and recommended labs and tests       Follow up with primary care provider, Oneal Liu, within 7 days for hospital follow- up.                   Additional Services     Home Care OT Referral for Hospital Discharge       OT to eval and treat    Your provider has ordered home care - occupational therapy. If you have not been contacted within 2 days of your discharge please call the department phone number listed on the top of this document.            Home Care PT Referral for Hospital Discharge       PT to eval and treat    Your provider has ordered home care - physical therapy. If you have not been contacted within 2 days of your discharge please call the department phone number listed on the top of this document.            Home care nursing referral       Resume [facility]                  Further instructions from your care team       [facility] to provide home RN and PT. Their phone is [phone].      Instructions on use of Insulin pen:    * Do a fasting blood sugar every morning prior to giving insulin*      1.  Wash hands.  Take cap off and swab top of pen with alcohol to clean  2.  Removed paper from needle and attach needle by screwing on.  3.  Remove the outer cap and the inner blue cap to expose needle.    4.  Dial dose to 2 and push the button on the end to prime needle; you should see it coming out of needle  5.  Dial dose (currently 14 units)  6.  Clean area on abdomen with alcohol wipe (stay away from umbilicus by 2 inches and any bruises or scars)  5.  Insert needle straight into skin and hold needle in.  Push button on end all the way (til it reads 0)      Hold at least 6 seconds then remove.    6. Unscrew needle to remove and dispose in a sharps container.    7. Recap the pen to keep the insulin protected from light.    **CHECK BLOOD SUGAR TWICE A DAY.    **ALWAYS CHECK A MORNING FASTING SUGAR THEN VARY THE OTHER TIMES TO BEFORE LUNCH OR BEFORE DINNER.          Pending Results     Date and Time Order Name Status Description    4/14/2018 1042 Blood culture Preliminary     4/14/2018 1005 Blood culture Preliminary         "     Statement of Approval     Ordered          18 1242  I have reviewed and agree with all the recommendations and orders detailed in this document.  EFFECTIVE NOW     Approved and electronically signed by:  Richard Moore MD             Admission Information     Date & Time Provider Department Dept. Phone    2018 Naga Denney MD [place] [address] [facility] [phone]      Your Vitals Were     Blood Pressure Pulse Temperature Respirations Height Weight    147/76 (BP Location: Left arm) 84 97.5  F (36.4  C) (Oral) 18 1.727 m (5' 8\") 58.2 kg (128 lb 4.9 oz)    Pulse Oximetry BMI (Body Mass Index)                92% 19.51 kg/m2          [employer] Information     [facility] lets you send messages to your doctor, view your test results, renew your prescriptions, schedule appointments and more. To sign up, go to [email or web address] . Click on \"Log in\" on the left side of the screen, which will take you to the Welcome page. Then click on \"Sign up Now\" on the right side of the page.     You will be asked to enter the access code listed below, as well as some personal information. Please follow the directions to create your username and password.     Your access code is: [email or web address]  Expires: 7/15/2018  4:52 PM     Your access code will  in 90 days. If you need help or a new code, please call your [facility] clinic or [phone].        Care EveryWhere ID     This is your Care EveryWhere ID. This could be used by other organizations to access your [facility] medical records  [id number]        Equal Access to Services     [facility]: Hadii alexandr Kelly, theron baez, alona dacosta. So [facility] [phone].    ATENCIÓN: Si habla español, tiene a langston disposición servicios gratuitos de asistencia lingüística. Llame al [phone].    We comply with applicable federal civil rights laws and [place] laws. We do " not discriminate on the basis of race, color, national origin, age, disability, sex, sexual orientation, or gender identity.               Review of your medicines      START taking        Dose / Directions    alcohol swab prep pads   Used for:  Type 2 diabetes mellitus with hyperosmolarity without coma, without long-term current use of insulin (H)        Use to swab area of injection/elvin as directed.   Quantity:  100 each   Refills:  3       amoxicillin 500 MG tablet   Commonly known as:  AMOXIL   Indication:  Urinary Tract Infection, Enterococcus        Dose:  500 mg   Take 1 tablet (500 mg) by mouth 3 times daily   Quantity:  6 tablet   Refills:  0       blood glucose calibration solution   Commonly known as:  NO BRAND SPECIFIED   Used for:  Type 2 diabetes mellitus with hyperosmolarity without coma, without long-term current use of insulin (H)        Use to calibrate blood glucose monitor as needed as directed.   Quantity:  1 Bottle   Refills:  3       blood glucose monitoring meter device kit   Commonly known as:  no brand specified   Used for:  Type 2 diabetes mellitus with hyperosmolarity without coma, without long-term current use of insulin (H)        Use to test blood sugar 4 times daily or as directed.   Quantity:  1 kit   Refills:  0       blood glucose monitoring test strip   Commonly known as:  no brand specified   Used for:  Type 2 diabetes mellitus with hyperosmolarity without coma, without long-term current use of insulin (H)        Use to test blood sugar 2-3 times daily or as directed.   Quantity:  100 strip   Refills:  6       glucagon 1 MG Solr injection   Used for:  Type 2 diabetes mellitus with hyperosmolarity without coma, without long-term current use of insulin (H)        Dose:  1 mg   Inject 1 mg Subcutaneous every 15 minutes as needed for low blood sugar (May repeat x 1 only)   Quantity:  10 mg   Refills:  0       insulin glargine 100 UNIT/ML injection   Commonly known as:  LANTUS    Used for:  Type 2 diabetes mellitus with hyperosmolarity without coma, without long-term current use of insulin (H)        Dose:  14 Units   Inject 14 Units Subcutaneous every morning   Quantity:  3 mL   Refills:  0       multivitamin, therapeutic with minerals Tabs tablet   Used for:  Type 2 diabetes mellitus with hyperosmolarity without coma, without long-term current use of insulin (H)        Dose:  1 tablet   Take 1 tablet by mouth daily   Quantity:  30 each   Refills:  0       tamsulosin 0.4 MG capsule   Commonly known as:  FLOMAX   Used for:  Benign prostatic hyperplasia with lower urinary tract symptoms, symptom details unspecified        Dose:  0.4 mg   Take 1 capsule (0.4 mg) by mouth daily   Quantity:  60 capsule   Refills:  0       thin lancets   Commonly known as:  NO BRAND SPECIFIED   Used for:  Type 2 diabetes mellitus with hyperosmolarity without coma, without long-term current use of insulin (H)        Use with lanceting device.   Quantity:  90 each   Refills:  1         CONTINUE these medicines which have NOT CHANGED        Dose / Directions    budesonide-formoterol 160-4.5 MCG/ACT Inhaler   Commonly known as:  SYMBICORT        Dose:  2 puff   Inhale 2 puffs into the lungs 2 times daily   Refills:  0       ipratropium - albuterol 0.5 mg/2.5 mg/3 mL 0.5-2.5 (3) MG/3ML neb solution   Commonly known as:  DUONEB        Dose:  1 vial   Take 1 vial by nebulization every 6 hours as needed for shortness of breath / dyspnea or wheezing   Refills:  0       METOPROLOL TARTRATE PO        Dose:  50 mg   Take 50 mg by mouth 2 times daily   Refills:  0       POTASSIUM CITRATE PO        Dose:  10 mEq   Take 10 mEq by mouth 3 times daily (with meals)   Refills:  0       rosuvastatin 10 MG tablet   Commonly known as:  CRESTOR        Dose:  10 mg   Take 10 mg by mouth daily   Refills:  0       triamcinolone 0.025 % ointment   Commonly known as:  KENALOG        Apply topically 2 times daily   Refills:  0         STOP  taking     CEFUROXIME AXETIL PO           FLUCONAZOLE PO           sitagliptin 100 MG tablet   Commonly known as:  ISH                Where to get your medicines      These medications were sent to [facility] [place] - [address]  [address] [place] [address]     Phone:  [phone]     alcohol swab prep pads    amoxicillin 500 MG tablet    blood glucose calibration solution    blood glucose monitoring meter device kit    blood glucose monitoring test strip    insulin glargine 100 UNIT/ML injection    thin lancets         These medications were sent to [facility] [id number] [place] [address] - [address] [facility] [address] [facility] [address]     Phone:  [phone]     glucagon 1 MG Solr injection    multivitamin, therapeutic with minerals Tabs tablet    tamsulosin 0.4 MG capsule                Protect others around you: Learn how to safely use, store and throw away your medicines at www.disposemymeds.org.        ANTIBIOTIC INSTRUCTION     You've Been Prescribed an Antibiotic - Now What?  Your healthcare team thinks that you or your loved one might have an infection. Some infections can be treated with antibiotics, which are powerful, life-saving drugs. Like all medications, antibiotics have side effects and should only be used when necessary. There are some important things you should know about your antibiotic treatment.      Your healthcare team may run tests before you start taking an antibiotic.    Your team may take samples (e.g., from your blood, urine or other areas) to run tests to look for bacteria. These test can be important to determine if you need an antibiotic at all and, if you do, which antibiotic will work best.      Within a few days, your healthcare team might change or even stop your antibiotic.    Your team may start you on an antibiotic while they are working to find out what is making you  sick.    Your team might change your antibiotic because test results show that a different antibiotic would be better to treat your infection.    In some cases, once your team has more information, they learn that you do not need an antibiotic at all. They may find out that you don't have an infection, or that the antibiotic you're taking won't work against your infection. For example, an infection caused by a virus can't be treated with antibiotics. Staying on an antibiotic when you don't need it is more likely to be harmful than helpful.      You may experience side effects from your antibiotic.    Like all medications, antibiotics have side effects. Some of these can be serious.    Let you healthcare team know if you have any known allergies when you are admitted to the hospital.    One significant side effect of nearly all antibiotics is the risk of severe and sometimes deadly diarrhea caused by Clostridium difficile (C. Difficile). This occurs when a person takes antibiotics because some good germs are destroyed. Antibiotic use allows C. diificile to take over, putting patients at high risk for this serious infection.    As a patient or caregiver, it is important to understand your or your loved one's antibiotic treatment. It is especially important for caregivers to speak up when patients can't speak for themselves. Here are some important questions to ask your healthcare team.    What infection is this antibiotic treating and how do you know I have that infection?    What side effects might occur from this antibiotic?    How long will I need to take this antibiotic?    Is it safe to take this antibiotic with other medications or supplements (e.g., vitamins) that I am taking?     Are there any special directions I need to know about taking this antibiotic? For example, should I take it with food?    How will I be monitored to know whether my infection is responding to the antibiotic?    What tests may help to  make sure the right antibiotic is prescribed for me?      Information provided by:  www.cdc.gov/getsmart  U.S. Department of Health and Human Services  Centers for disease Control and Prevention  National Center for Emerging and Zoonotic Infectious Diseases  Division of Healthcare Quality Promotion             Medication List: This is a list of all your medications and when to take them. Check marks below indicate your daily home schedule. Keep this list as a reference.      Medications           Morning Afternoon Evening Bedtime As Needed    alcohol swab prep pads   Use to swab area of injection/elvin as directed.                                amoxicillin 500 MG tablet   Commonly known as:  AMOXIL   Take 1 tablet (500 mg) by mouth 3 times daily   Last time this was given:  875 mg on 4/19/2018  9:01 AM                                         blood glucose calibration solution   Commonly known as:  NO BRAND SPECIFIED   Use to calibrate blood glucose monitor as needed as directed.                                blood glucose monitoring meter device kit   Commonly known as:  no brand specified   Use to test blood sugar 4 times daily or as directed.                                blood glucose monitoring test strip   Commonly known as:  no brand specified   Use to test blood sugar 2-3 times daily or as directed.                                budesonide-formoterol 160-4.5 MCG/ACT Inhaler   Commonly known as:  SYMBICORT   Inhale 2 puffs into the lungs 2 times daily   Next Dose Due:  Bedtime tonight                                      glucagon 1 MG Solr injection   Inject 1 mg Subcutaneous every 15 minutes as needed for low blood sugar (May repeat x 1 only)                                   insulin glargine 100 UNIT/ML injection   Commonly known as:  LANTUS   Inject 14 Units Subcutaneous every morning   Last time this was given:  13 Units on 4/18/2018  9:50 PM   Next Dose Due:  7 am Friday morning 4/20                                    ipratropium - albuterol 0.5 mg/2.5 mg/3 mL 0.5-2.5 (3) MG/3ML neb solution   Commonly known as:  DUONEB   Take 1 vial by nebulization every 6 hours as needed for shortness of breath / dyspnea or wheezing                                   METOPROLOL TARTRATE PO   Take 50 mg by mouth 2 times daily   Last time this was given:  50 mg on 4/19/2018  9:01 AM   Next Dose Due:  Bedtime tonight                                      multivitamin, therapeutic with minerals Tabs tablet   Take 1 tablet by mouth daily   Last time this was given:  1 tablet on 4/18/2018  8:17 AM   Next Dose Due:  Friday morning 4/20                                   POTASSIUM CITRATE PO   Take 10 mEq by mouth 3 times daily (with meals)   Next Dose Due:  This evening meal                                         rosuvastatin 10 MG tablet   Commonly known as:  CRESTOR   Take 10 mg by mouth daily   Next Dose Due:  Restart at home                                   tamsulosin 0.4 MG capsule   Commonly known as:  FLOMAX   Take 1 capsule (0.4 mg) by mouth daily   Last time this was given:  0.4 mg on 4/19/2018  9:01 AM   Next Dose Due:  Friday 4/20 morning                                   thin lancets   Commonly known as:  NO BRAND SPECIFIED   Use with lanceting device.                                triamcinolone 0.025 % ointment   Commonly known as:  KENALOG   Apply topically 2 times daily   Next Dose Due:  Restart at home

## 2018-04-14 NOTE — ED NOTES
Bed: ED04  Expected date:   Expected time:   Means of arrival:   Comments:  [facility]

## 2018-04-14 NOTE — IP AVS SNAPSHOT
[facility] [address] [facility]    [address] [place] [address]    Phone:  [phone]                                       After Visit Summary   4/14/2018    Scott Alexander MD    MRN: [id number]           After Visit Summary Signature Page     I have received my discharge instructions, and my questions have been answered. I have discussed any challenges I see with this plan with the nurse or doctor.    ..........................................................................................................................................  Patient/Patient Representative Signature      ..........................................................................................................................................  Patient Representative Print Name and Relationship to Patient    ..................................................               ................................................  Date                                            Time    ..........................................................................................................................................  Reviewed by Signature/Title    ...................................................              ..............................................  Date                                                            Time

## 2018-04-15 ENCOUNTER — APPOINTMENT (OUTPATIENT)
Dept: SPEECH THERAPY | Facility: CLINIC | Age: 83
DRG: 637 | End: 2018-04-15
Attending: INTERNAL MEDICINE
Payer: MEDICARE

## 2018-04-15 LAB
ALBUMIN SERPL-MCNC: 2.9 G/DL (ref 3.4–5)
ALP SERPL-CCNC: 142 U/L (ref 40–150)
ALT SERPL W P-5'-P-CCNC: 32 U/L (ref 0–70)
ANION GAP SERPL CALCULATED.3IONS-SCNC: 10 MMOL/L (ref 3–14)
ANION GAP SERPL CALCULATED.3IONS-SCNC: 8 MMOL/L (ref 3–14)
AST SERPL W P-5'-P-CCNC: 20 U/L (ref 0–45)
BASOPHILS # BLD AUTO: 0.1 10E9/L (ref 0–0.2)
BASOPHILS NFR BLD AUTO: 0.9 %
BILIRUB DIRECT SERPL-MCNC: 0.3 MG/DL (ref 0–0.2)
BILIRUB SERPL-MCNC: 0.7 MG/DL (ref 0.2–1.3)
BUN SERPL-MCNC: 16 MG/DL (ref 7–30)
BUN SERPL-MCNC: 18 MG/DL (ref 7–30)
CALCIUM SERPL-MCNC: 8.2 MG/DL (ref 8.5–10.1)
CALCIUM SERPL-MCNC: 8.6 MG/DL (ref 8.5–10.1)
CHLORIDE SERPL-SCNC: 113 MMOL/L (ref 94–109)
CHLORIDE SERPL-SCNC: 114 MMOL/L (ref 94–109)
CO2 SERPL-SCNC: 22 MMOL/L (ref 20–32)
CO2 SERPL-SCNC: 25 MMOL/L (ref 20–32)
CREAT SERPL-MCNC: 1.11 MG/DL (ref 0.66–1.25)
CREAT SERPL-MCNC: 1.16 MG/DL (ref 0.66–1.25)
DIFFERENTIAL METHOD BLD: ABNORMAL
EOSINOPHIL # BLD AUTO: 0.1 10E9/L (ref 0–0.7)
EOSINOPHIL NFR BLD AUTO: 1.4 %
ERYTHROCYTE [DISTWIDTH] IN BLOOD BY AUTOMATED COUNT: 22.4 % (ref 10–15)
GFR SERPL CREATININE-BSD FRML MDRD: 59 ML/MIN/1.7M2
GFR SERPL CREATININE-BSD FRML MDRD: 62 ML/MIN/1.7M2
GLUCOSE BLDC GLUCOMTR-MCNC: 194 MG/DL (ref 70–99)
GLUCOSE BLDC GLUCOMTR-MCNC: 204 MG/DL (ref 70–99)
GLUCOSE BLDC GLUCOMTR-MCNC: 213 MG/DL (ref 70–99)
GLUCOSE BLDC GLUCOMTR-MCNC: 240 MG/DL (ref 70–99)
GLUCOSE BLDC GLUCOMTR-MCNC: 267 MG/DL (ref 70–99)
GLUCOSE SERPL-MCNC: 184 MG/DL (ref 70–99)
GLUCOSE SERPL-MCNC: 264 MG/DL (ref 70–99)
HCT VFR BLD AUTO: 39.9 % (ref 40–53)
HGB BLD-MCNC: 12.2 G/DL (ref 13.3–17.7)
IMM GRANULOCYTES # BLD: 0.1 10E9/L (ref 0–0.4)
IMM GRANULOCYTES NFR BLD: 0.6 %
LYMPHOCYTES # BLD AUTO: 1.6 10E9/L (ref 0.8–5.3)
LYMPHOCYTES NFR BLD AUTO: 16.3 %
MCH RBC QN AUTO: 24.2 PG (ref 26.5–33)
MCHC RBC AUTO-ENTMCNC: 30.6 G/DL (ref 31.5–36.5)
MCV RBC AUTO: 79 FL (ref 78–100)
MONOCYTES # BLD AUTO: 0.8 10E9/L (ref 0–1.3)
MONOCYTES NFR BLD AUTO: 8.1 %
NEUTROPHILS # BLD AUTO: 7.2 10E9/L (ref 1.6–8.3)
NEUTROPHILS NFR BLD AUTO: 72.7 %
NRBC # BLD AUTO: 0 10*3/UL
NRBC BLD AUTO-RTO: 0 /100
PHOSPHATE SERPL-MCNC: 2.5 MG/DL (ref 2.5–4.5)
PLATELET # BLD AUTO: 337 10E9/L (ref 150–450)
POTASSIUM SERPL-SCNC: 3.6 MMOL/L (ref 3.4–5.3)
POTASSIUM SERPL-SCNC: 3.7 MMOL/L (ref 3.4–5.3)
PROT SERPL-MCNC: 7.4 G/DL (ref 6.8–8.8)
RBC # BLD AUTO: 5.04 10E12/L (ref 4.4–5.9)
SODIUM SERPL-SCNC: 145 MMOL/L (ref 133–144)
SODIUM SERPL-SCNC: 147 MMOL/L (ref 133–144)
TSH SERPL DL<=0.005 MIU/L-ACNC: 1.68 MU/L (ref 0.4–4)
WBC # BLD AUTO: 9.9 10E9/L (ref 4–11)

## 2018-04-15 PROCEDURE — 25000131 ZZH RX MED GY IP 250 OP 636 PS 637: Performed by: INTERNAL MEDICINE

## 2018-04-15 PROCEDURE — 12000000 ZZH R&B MED SURG/OB

## 2018-04-15 PROCEDURE — 36415 COLL VENOUS BLD VENIPUNCTURE: CPT | Performed by: INTERNAL MEDICINE

## 2018-04-15 PROCEDURE — 25000128 H RX IP 250 OP 636: Performed by: INTERNAL MEDICINE

## 2018-04-15 PROCEDURE — 84100 ASSAY OF PHOSPHORUS: CPT | Performed by: INTERNAL MEDICINE

## 2018-04-15 PROCEDURE — 84443 ASSAY THYROID STIM HORMONE: CPT | Performed by: INTERNAL MEDICINE

## 2018-04-15 PROCEDURE — 25000131 ZZH RX MED GY IP 250 OP 636 PS 637: Mod: GY

## 2018-04-15 PROCEDURE — 25000125 ZZHC RX 250: Performed by: INTERNAL MEDICINE

## 2018-04-15 PROCEDURE — 80048 BASIC METABOLIC PNL TOTAL CA: CPT | Performed by: INTERNAL MEDICINE

## 2018-04-15 PROCEDURE — 92526 ORAL FUNCTION THERAPY: CPT | Mod: GN | Performed by: SPEECH-LANGUAGE PATHOLOGIST

## 2018-04-15 PROCEDURE — 25000132 ZZH RX MED GY IP 250 OP 250 PS 637

## 2018-04-15 PROCEDURE — 92610 EVALUATE SWALLOWING FUNCTION: CPT | Mod: GN | Performed by: SPEECH-LANGUAGE PATHOLOGIST

## 2018-04-15 PROCEDURE — 00000146 ZZHCL STATISTIC GLUCOSE BY METER IP

## 2018-04-15 PROCEDURE — 25000132 ZZH RX MED GY IP 250 OP 250 PS 637: Performed by: UROLOGY

## 2018-04-15 PROCEDURE — 85025 COMPLETE CBC W/AUTO DIFF WBC: CPT | Performed by: INTERNAL MEDICINE

## 2018-04-15 PROCEDURE — 80076 HEPATIC FUNCTION PANEL: CPT | Performed by: INTERNAL MEDICINE

## 2018-04-15 PROCEDURE — 40000225 ZZH STATISTIC SLP WARD VISIT: Performed by: SPEECH-LANGUAGE PATHOLOGIST

## 2018-04-15 PROCEDURE — 99232 SBSQ HOSP IP/OBS MODERATE 35: CPT | Performed by: INTERNAL MEDICINE

## 2018-04-15 PROCEDURE — 25000132 ZZH RX MED GY IP 250 OP 250 PS 637: Performed by: INTERNAL MEDICINE

## 2018-04-15 RX ORDER — CEFUROXIME AXETIL 250 MG/1
250 TABLET ORAL EVERY 12 HOURS SCHEDULED
Status: DISCONTINUED | OUTPATIENT
Start: 2018-04-15 | End: 2018-04-16

## 2018-04-15 RX ORDER — MULTIPLE VITAMINS W/ MINERALS TAB 9MG-400MCG
1 TAB ORAL DAILY
Status: DISCONTINUED | OUTPATIENT
Start: 2018-04-15 | End: 2018-04-19 | Stop reason: HOSPADM

## 2018-04-15 RX ORDER — NICOTINE POLACRILEX 4 MG
15-30 LOZENGE BUCCAL
Status: DISCONTINUED | OUTPATIENT
Start: 2018-04-15 | End: 2018-04-19 | Stop reason: HOSPADM

## 2018-04-15 RX ORDER — SENNOSIDES 8.6 MG
1-2 TABLET ORAL 2 TIMES DAILY PRN
Status: DISCONTINUED | OUTPATIENT
Start: 2018-04-15 | End: 2018-04-19 | Stop reason: HOSPADM

## 2018-04-15 RX ORDER — METOPROLOL TARTRATE 1 MG/ML
2.5 INJECTION, SOLUTION INTRAVENOUS EVERY 6 HOURS
Status: DISCONTINUED | OUTPATIENT
Start: 2018-04-15 | End: 2018-04-15

## 2018-04-15 RX ORDER — METOPROLOL TARTRATE 50 MG
50 TABLET ORAL 2 TIMES DAILY
Status: DISCONTINUED | OUTPATIENT
Start: 2018-04-15 | End: 2018-04-19 | Stop reason: HOSPADM

## 2018-04-15 RX ORDER — METOPROLOL TARTRATE 50 MG
50 TABLET ORAL
Status: DISCONTINUED | OUTPATIENT
Start: 2018-04-15 | End: 2018-04-15

## 2018-04-15 RX ORDER — TAMSULOSIN HYDROCHLORIDE 0.4 MG/1
0.4 CAPSULE ORAL DAILY
Status: DISCONTINUED | OUTPATIENT
Start: 2018-04-15 | End: 2018-04-19 | Stop reason: HOSPADM

## 2018-04-15 RX ORDER — DEXTROSE MONOHYDRATE 25 G/50ML
25-50 INJECTION, SOLUTION INTRAVENOUS
Status: DISCONTINUED | OUTPATIENT
Start: 2018-04-15 | End: 2018-04-19 | Stop reason: HOSPADM

## 2018-04-15 RX ORDER — BISACODYL 10 MG
10 SUPPOSITORY, RECTAL RECTAL DAILY PRN
Status: DISCONTINUED | OUTPATIENT
Start: 2018-04-15 | End: 2018-04-19 | Stop reason: HOSPADM

## 2018-04-15 RX ADMIN — SODIUM PHOSPHATE, DIBASIC AND SODIUM PHOSPHATE, MONOBASIC 1 ENEMA: 7; 19 ENEMA RECTAL at 13:19

## 2018-04-15 RX ADMIN — FLUTICASONE FUROATE AND VILANTEROL TRIFENATATE 1 PUFF: 200; 25 POWDER RESPIRATORY (INHALATION) at 13:57

## 2018-04-15 RX ADMIN — METOPROLOL TARTRATE 50 MG: 50 TABLET ORAL at 13:36

## 2018-04-15 RX ADMIN — ENOXAPARIN SODIUM 40 MG: 40 INJECTION SUBCUTANEOUS at 21:18

## 2018-04-15 RX ADMIN — MULTIPLE VITAMINS W/ MINERALS TAB 1 TABLET: TAB at 13:58

## 2018-04-15 RX ADMIN — CEFUROXIME AXETIL 250 MG: 250 TABLET ORAL at 21:10

## 2018-04-15 RX ADMIN — METOPROLOL TARTRATE 50 MG: 50 TABLET ORAL at 21:10

## 2018-04-15 RX ADMIN — INSULIN ASPART 2 UNITS: 100 INJECTION, SOLUTION INTRAVENOUS; SUBCUTANEOUS at 00:25

## 2018-04-15 RX ADMIN — POTASSIUM CHLORIDE AND SODIUM CHLORIDE: 450; 150 INJECTION, SOLUTION INTRAVENOUS at 16:14

## 2018-04-15 RX ADMIN — INSULIN GLARGINE 10 UNITS: 100 INJECTION, SOLUTION SUBCUTANEOUS at 18:39

## 2018-04-15 RX ADMIN — METOPROLOL TARTRATE 2.5 MG: 5 INJECTION, SOLUTION INTRAVENOUS at 10:01

## 2018-04-15 RX ADMIN — INSULIN ASPART 2 UNITS: 100 INJECTION, SOLUTION INTRAVENOUS; SUBCUTANEOUS at 04:11

## 2018-04-15 RX ADMIN — INSULIN ASPART 3 UNITS: 100 INJECTION, SOLUTION INTRAVENOUS; SUBCUTANEOUS at 18:11

## 2018-04-15 RX ADMIN — INSULIN ASPART 3 UNITS: 100 INJECTION, SOLUTION INTRAVENOUS; SUBCUTANEOUS at 14:02

## 2018-04-15 RX ADMIN — TAMSULOSIN HYDROCHLORIDE 0.4 MG: 0.4 CAPSULE ORAL at 10:03

## 2018-04-15 RX ADMIN — POTASSIUM CHLORIDE AND SODIUM CHLORIDE: 450; 150 INJECTION, SOLUTION INTRAVENOUS at 06:28

## 2018-04-15 RX ADMIN — INSULIN ASPART 2 UNITS: 100 INJECTION, SOLUTION INTRAVENOUS; SUBCUTANEOUS at 08:25

## 2018-04-15 NOTE — PROGRESS NOTES
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  On admission, pt afebrile,slightly hypertensive. Head cT, cXR both negative. UA shows mild pyuria- pt presents on cefuroxime     /Possible UTI:   UA not have significant pyuria. Presents on diflucan and cefuroxime per his primary ID provider.  Has had some dysuria and flank pain but hx is vague. CT abd without contrast shows 6 mm R renal stone. No ureteral stone or hydronephrosis.  Multiple renal cysts. Slight hematuria.   -hold po abx and antifungal  - IV Rocephin- stop abx or complete limited course if cx negative.   -restart diflucan if yeast on cx  - bld cx- ngtd  -ucx pending  - urology associates consult.   -fluids  -npo until seen by speech     DM-2/Hyperglycemia  - running in 200 range at home.  Associated dehydration and encephalopathy. Taking januvia 100mg qd at home.  No longer on metformin.    - IVFs  - npo for not as failed bedside RN swallow eval  - ISS for npo status.   - start low dose lantus once taking po  - hold januvia     Metabolic Encephalopathy: acute  2/2 to dehydration, possible UTI, hyperglycemia.    Speech improved. More alert but remains confused/disoriented.   -ivfs  -am labs  -tx hyperglycemia  -follow lytes  -tx possible UTI.   -delirium protocol        Failure to thrive/Anorexia  - consider possible persisting urinary tract infection  -abx emperically- presents on abx  -tx hyperglycemia  - nutrition consult  -albumin  -PT, OT  -likely needs TCU.      Dehydration:   2/2 to hyperglycemia, poor po intake   -ivfs     Hypernatremia: Na 144 on presentation. Increased to 151 post fluid bolus.  Calculated free water deficit is 2.7 liters. Replace 1/2 free water in first 24 hours. 1/2 NS given hyperglycemia, daily bmp for now.      Grade 1 pressure ulcer: coccyx. present on admission.  Wound care consult. Topical covering.       HTN: IV metoprolol scheduled for now.  Consider prn hydralazine. Follow. Pt currently npo    DVT Prophylaxis: mechanical prophylaxis. Change to lovenox or sq heparin if know no urologic procedure not needed.      Code Status: Full Code- discussed with pt wife     Disposition: Expected discharge in >2 days    Naga Denney MD  Text Page  (7am to 6pm)  Interval History   Elevated PVR in 100-200 range  No pain, HA, n/v/d/abd pain.    No RN concerns.       -Data reviewed today: I reviewed all new labs and imaging results over the last 24 hours.    Physical Exam   Temp: 97.8  F (36.6  C) Temp src: Oral BP: (!) 180/105 Pulse: 92 Heart Rate: 81 Resp: 18 SpO2: 95 % O2 Device: None (Room air)    Vitals:    04/15/18 0656   Weight: 58.9 kg (129 lb 13.6 oz)     Vital Signs with Ranges  Temp:  [97.6  F (36.4  C)-98.2  F (36.8  C)] 97.8  F (36.6  C)  Pulse:  [92] 92  Heart Rate:  [81-89] 81  Resp:  [18-38] 18  BP: (141-180)/() 180/105  SpO2:  [93 %-98 %] 95 %  I/O last 3 completed shifts:  In: 650 [I.V.:650]  Out: 325 [Urine:325]    Constitutional: nad, appears comfortable, nontoxic  Eyes: eomi, perrla  HEENT: nl op but dry MM  Respiratory: cTAB  Cardiovascular: RRR, no r/g/m  Chest: pacemaker Left upper chest  GI: soft, NT ND, no hepatosplenomegaly  Skin: no rash, echymosis on knees, grade 1 pressure ulcer coccyx present on admission. Slightly redness, no purulence  Musculoskeletal: no focal swelling or redness or pain with rom  Neurologic: disoriented to place, year, date. Oriented to name and knows in hospital. [facility]. Clear speech today, Tremor of head and arms, strength 5/5 extrem X 4. Babinski negative bilaterally, Face symmetric, tongue midline.   Psychiatric: calm, cooperative.      Medications     0.45% sodium chloride + KCl 20 mEq/L 100 mL/hr at 04/15/18 0628       metoprolol tartrate (LOPRESSOR) tablet 50 mg  50 mg Oral BID     cefTRIAXone  1 g Intravenous Q24H     fluticasone-vilanterol  1 puff Inhalation Daily     insulin aspart  1-6 Units Subcutaneous Q4H       Data     Recent Labs  Lab 04/14/18  1602 04/14/18  1020   WBC 10.6 13.2*   HGB 11.4* 12.4*   MCV 78 77*    400   * 144   POTASSIUM 3.5 4.2   CHLORIDE 118* 110*   CO2 24 22   BUN 18 20   CR 1.18 1.33*   ANIONGAP 9 12   GURU 8.5 9.3   * 439*   ALBUMIN 2.8* 3.1*   PROTTOTAL 6.8 7.7   BILITOTAL 0.9 1.0   ALKPHOS 146 165*   ALT 33 40   AST 12 11   TROPI  --  <0.015       Imaging:   Recent Results (from the past 24 hour(s))   XR Chest 2 Views    Narrative    CHEST TWO VIEWS  4/14/2018 10:57 AM     HISTORY: Fever, weakness.       Impression    IMPRESSION: PA and lateral views of the chest. Lungs are clear. Heart  is normal in size. No effusions are evident. No pneumothorax.  Implantable cardiac device and three leads appear in good position.    MARC RAZO MD   CT Head w/o Contrast    Narrative    CT OF THE HEAD WITHOUT CONTRAST 4/14/2018 1:06 PM     COMPARISON: None.    HISTORY: Confusion.     TECHNIQUE: 5 mm thick axial CT images of the head were acquired  without IV contrast material.    FINDINGS:  There is moderate diffuse cerebral volume loss. There are  extensive confluent areas of decreased density in the cerebral white  matter bilaterally that are consistent with sequela of chronic small  vessel ischemic disease.     The ventricles and basal cisterns are within normal limits in  configuration given the degree of cerebral volume loss.  There is no  midline shift. There are no extra-axial fluid collections.     No intracranial hemorrhage, mass or recent infarct.    The visualized paranasal sinuses are well aerated. There is no  mastoiditis. There are no fractures of the visualized bones.       Impression     IMPRESSION:  Diffuse cerebral volume loss and cerebral white matter  changes consistent with chronic small vessel ischemic disease. No  evidence for acute intracranial pathology.     Radiation dose for this scan was reduced using automated exposure  control, adjustment of the mA and/or kV according to patient size, or  iterative reconstruction technique.    CARL SOLITARIO MD   CT Abdomen Pelvis w/o Contrast    Narrative    CT ABDOMEN PELVIS W/O CONTRAST 4/14/2018 4:37 PM    HISTORY: Urinary tract infection.    TECHNIQUE: CT imaging of the abdomen and pelvis is performed without  IV contrast.  Abdominal organs, pelvic organs, bowel, aorta,  retroperitoneum, and abdominal wall are also assessed to the limits of  no IV contrast.  Radiation dose for this scan was reduced using  automated exposure control, adjustment of the mA and/or kV according  to patient size, or iterative reconstruction technique.    COMPARISON: January 22, 2008.    FINDINGS:    Liver: Normal.  Gallbladder:Normal.  Pancreas:Normal.  Spleen:Normal.  Adrenals:Normal.  Ascites:None.    Kidneys/ureters: There are multiple bilateral renal cysts, which have  increased in size compared to prior exam. There is a 6 mm  nonobstructing stone in the mid right kidney. No evidence of  hydronephrosis. No abscess.  Bladder:Normal.  Pelvic free fluid:None.    Bowels: Scattered colonic diverticula without evidence of  diverticulitis.  No evidence of obstruction.  Appendix: Not visualized.    Abdominal or pelvic lymphadenopathy:None.    Miscellaneous findings: Radiation seeds within prostate.      Impression    IMPRESSION:    1. Nonobstructing 6 mm stone in the mid right kidney. No evidence of  hydronephrosis. No abscess.  2. Multiple bilateral renal cysts which have increased in size  compared to prior exam.  3. Colonic diverticula without evidence of diverticulitis.    LISSETT ANSARI MD

## 2018-04-15 NOTE — PROGRESS NOTES
04/15/18 1000   General Information   Onset Date 04/14/18   Start of Care Date 04/15/18   Referring Physician Dr. 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 who presents with hx of renal stones, Klebsiella uti, recurrent admissions for cystitis at [facility], DM-2 poorly controlled presented from home with    Clinical Swallow Evaluation   Oral Musculature generally intact   Dentition present and adequate   Mucosal Quality dry   Mandibular Strength and Mobility intact   Oral Labial Strength and Mobility WFL   Lingual Strength and Mobility WFL   Buccal Strength and Mobility intact   Laryngeal Function Cough;Swallow;Voicing initiated;Dry swallow palpated   Additional Documentation Yes   Clinical Swallow Eval: Thin Liquid Texture Trial   Mode of Presentation, Thin Liquids cup;self-fed   Volume of Liquid or Food Presented 6 oz single and multiple sips   Oral Phase of Swallow WFL   Pharyngeal Phase of Swallow intact   Diagnostic Statement NO overt s/s aspiration   Clinical Swallow Eval: Puree Solid Texture Trial   Mode of Presentation, Puree spoon;fed by clinician   Volume of Puree Presented 1/2t x 6   Oral Phase, Puree WFL   Pharyngeal Phase, Puree intact   Diagnostic Statement No overt s/s aspiration   Clinical Swallow Eval: Solid Food Texture Trial   Mode of Presentation, Solid self-fed   Volume of Solid Food Presented guerline cracker   Oral Phase, Solid WFL   Pharyngeal Phase, Solid intact   Diagnostic Statement No overt s/s aspiration   Swallow Eval: Clinical Impressions   Skilled Criteria for Therapy Intervention No problems  identified which require skilled intervention   Functional Assessment Scale (FAS) 7   Treatment Diagnosis no dysphagia   Recommended Feeding/Eating Techniques alternate between small bites and sips of food/liquid;maintain upright posture during/after eating for 30 mins;small sips/bites   Anticipated Discharge Disposition home   Risks and Benefits of Treatment have been explained. Yes   Patient, family and/or staff in agreement with Plan of Care Yes   Clinical Impression Comments Pt completed bedside swallow evaluation.  Nurse reported pt failed nursing swallow screen.  Pt positioned upright in bed.  Mouth cleaned out, dry due to mouth breathing.  Pt sipped water from cup-6oz and had cough x1 when multiple sip and initiated talking to soon.  Otherwise no overt s/s aspiration even with multiple sips.  Pt did take pill with multiple sips of water with no overt s/s aspiration.  Adequate bolus management with pudding and cracker as well.  No overt s/s aspiration on either of those textures.  Pt did sip water with cracker residue due to dry mouth and did not cough or demonstrate any difficulty managing water and solid in his mouth.  Swallow appears to be wfl at this time.  Educated pt on safe swallow guidelines and reflux precautions and to take single sips of water as safety measure as he gets stronger.  If he has difficulty taking pills with water later on he can take them in applesauce with a sip of water wash.  SLP services not required as swallow wfl at this time.  He should tolerate a regular diet with thin liquids sitting in chair when able, or fully upright in bed, small bites and sips as he gains strength and alternate solids and liquids for dry mouth.  Upright 1 hour after intake as reflux precaution.     Total Evaluation Time   Total Evaluation Time (Minutes) 13

## 2018-04-15 NOTE — PLAN OF CARE
Problem: Patient Care Overview  Goal: Plan of Care/Patient Progress Review  Outcome: Improving  Pt is A+O x 3, forgetful. [place] without hearing aids. BP is elevated, asymptomatic. Other VSS on RA. Denied pain. L eye slightly smaller than then the R at baseline. Neuro check WDL. Accu check , insulin coverage. Tele is 100% AV paced. Up with A1. Voiding, . Ecchymosis on arms and LEs. Mepilex on buttock is CDI. Red patch on R knee ROMAINE. VPM initiated because pt attempted to self transfer on previous shift. Pt is calm and cooperative, attempted to self transfer x 1 since 1930. IVF is infusing. Failed dysphasia screen, NPO. Hiccups. Remains on enteric contact. Urology consult ordered.

## 2018-04-15 NOTE — CONSULTS
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      NUTRITION HISTORY  - Information obtained from the pt and his wife.  Pt has had decreased intake and wt loss \"for a long time\". Pt and wife were not able to state specific timeframe.  It mostly began last July when pt had health issues and was in and out of the hospital frequently.  More recently, per H&P, pt has had a very poor appetite since d/c from [facility] on 4/3. It has been worse for 3 days PTA.      CURRENT NUTRITION ORDERS  Diet Order:     Starting regular diet, thin liquids   Pt had a swallow eval with SLP this morning    Current Intake/Tolerance:  Pt isn't particularly hungry, he only ordered Cream of Wheat.   He doesn't care for nutritional supplements but is willing to try a shake between meals.      PHYSICAL FINDINGS  Observed  None noted   Obtained from Chart/Interdisciplinary Team  Grade 1 pressure ulcer: coccyx. present on admission.  Wound care consult ordered.    ANTHROPOMETRICS  Height: 5' 8\"  Weight: 129 lbs 13.62 oz (58.9 kg) - 4/15  Body mass index is 19.74 kg/(m^2).  Weight Status:  Normal BMI  IBW: 63.6 kg +/- 10%  % IBW: 93%  Weight History: Pt has had wt loss but the timeframe is uncertain.  His wife confirms his usual wt was 165# " "\"2-3 years ago.\"  Per Care Everywhere, he was 145# (65.8 kg) on 3/31/18. He is down 15# (10%) in 2 weeks.  Wt Readings from Last 10 Encounters:   04/15/18 58.9 kg (129 lb 13.6 oz)   06/05/12 74.9 kg (165 lb 1.6 oz)   05/15/12 75.1 kg (165 lb 9.6 oz)   05/03/12 73.5 kg (162 lb)       LABS  Labs reviewed    MEDICATIONS  Medications reviewed      ASSESSED NUTRITION NEEDS PER APPROVED PRACTICE GUIDELINES:    Dosing Weight 58.9 kg - current wt 4/15  Estimated Energy Needs: 2590-9028 kcals (30-35 Kcal/Kg)  Justification: underweight  Estimated Protein Needs:  grams protein (1.5-2 g pro/Kg)  Justification: Repletion and wound healing      MALNUTRITION:  % Weight Loss:  > 2% in 1 week (severe malnutrition)  % Intake:  </= 50% for >/= 1 month (severe malnutrition)  Subcutaneous Fat Loss:  None observed  Muscle Loss:  None observed  Fluid Retention:  None noted    Malnutrition Diagnosis: Severe malnutrition  In Context of:  Acute illness or injury  Chronic illness or disease    NUTRITION DIAGNOSIS:  Inadequate protein-energy intake related to decreased appetite as evidenced by ongoing wt loss, specifically 10% wt loss x 2 weeks      NUTRITION INTERVENTIONS  Recommendations / Nutrition Prescription  Continue regular diet  Nutritional supplements      Implementation  Nutrition education: Per Provider order if indicated   Medical Food Supplement - will send Plus2 Shakes between meals  Multivitamin/Mineral - ordered daily Thera-Vit-M      Nutrition Goals  Pt will consume at least 50% of meals and supplements  No further wt loss (long-term goal)      MONITORING AND EVALUATION:  Progress towards goals will be monitored and evaluated per protocol and Practice Guidelines    Ary Falcon RD  Pager [phone] (M-F)            [phone] ([facility] Hol)                  "

## 2018-04-15 NOTE — CONSULTS
Consult Date:  04/15/2018      UROLOGY CONSULTATION   (assigned patient)     REQUESTING PHYSICIAN:  Naga Denney MD      CHIEF COMPLAINT:  Urinary frequency and kidney stone.      HISTORY OF PRESENT ILLNESS:  Patient is a 89-year-old gentleman who has history of type 2 diabetes, chronic kidney disease (stage 3), kidney stones (uric acid) and prostate cancer (treated with Brachytherapy in remote past).  He presented to [facility] emergency room/hospital yesterday with complaints of increasing fatigue and weakness over the past week, as well as urinary frequency and dysuria.  Per report, he had some flank pain at the time but denies that currently. CT scan was performed yesterday.  This shows no hydronephrosis.  There is a 6 mm stone in the lower pole of the right kidney.  There were no stones in the left kidney or in either ureter.  Of note, he has been on antibiotics (cefuroxime and fluconazole) prescribed by Dr. Haywood for the past week.      His recent urologic history has been somewhat active regarding his stones.  He was admitted to the hospital for a left ureteral stone and Klebsiella UTI in 01/2018.  The stone was treated with percutaneous nephrostomy tube then ultimately ureteroscopy and stent placement by Dr. Lambert.  He again had passage of a stone requiring intervention.  He underwent a left ureteroscopy with laser lithotripsy and stone removal of his left ureteral stones on 04/01/2018 by Dr. Choi.  At that time he was noted to have a Candida infection and was started on Diflucan.  His stent was removed at that time.  Per report, he was seen by Dr. Haywood last week and started on cefuroxime and Diflucan.  In addition, he has history of prostate cancer treated with brachytherapy many years ago by Dr. Bean at [facility].  His last PSA on record was 0.49 on 08/19/2016.  He does not recall the Urologist involved in his cares.      Currently denies any abdominal or flank pain.  He does  complain of urinary frequency during the day, roughly every hour.  He states he does not get up at night, but does have episodes of incontinence at night.  He denies any dysuria at the present time.  Bladder scan has been performed which showed post void residual ranges from 134-290 mL.  Residuals are higher at night.  He denies any prior prostate surgery.      PAST MEDICAL HISTORY:   1.  Type 2 diabetes.   2.  Hyperlipidemia.   3.  Essential tremor.   4.  Prostate cancer treated with brachytherapy in remote past.   5.  Kidney stones (uric acid stones).   6.  History of squamous cell carcinoma of the skin.   7.  Chronic kidney disease, stage 3.   8.  Iron deficiency anemia.      PAST SURGICAL HISTORY:   1.  Appendectomy.   2.  Cardiac angioplasty.   3.  Left ureteroscopy with laser lithotripsy and stone removal in 04/2018.      ALLERGIES:  METFORMIN, CEFTRIAXONE, DIATRIZOATE.       MEDICATIONS:   1.  Iron gluconate 324 mg a day.   2.  Metoprolol 50 mg daily.   3.  Crestor 10 mg daily.   4.  Januvia 100 mg daily.   5.  Vitamin B complex.     6.  He was started on Rocephin on admission.     7.  He also takes an inhaler (fluticasone).   8.  Vilanterol.   9.  Sliding scale for insulin.      SOCIAL HISTORY:  The patient is .  He is an internal medicine doctor.  Does not smoke.  Denies alcohol use currently.      FAMILY HISTORY:  Noncontributory.      REVIEW OF SYSTEMS:  Significant for fatigue, urgency, frequency, dysuria and incontinence of urine.  Otherwise, review of systems is negative.      PHYSICAL EXAMINATION:   VITAL SIGNS:  Temperature is 97.8, pulse 81, respirations 18, blood pressure is 180/105.   HEENT:  Normocephalic, atraumatic.  Pupils reactive bilaterally.  Oropharynx is clear.   LUNGS:  Clear to auscultation bilaterally.   HEART:  Reveals a regular rate and rhythm, no murmur.   GASTROINTESTINAL:  Soft, nontender.  No CVA tenderness.   GENITOURINARY:  There is an uncircumcised phallus.  Phimosis  present which cannot be retracted. Testicles are descended bilaterally without masses.     EXTREMITIES:  No cyanosis or edema.   NEUROLOGIC:  Strength is roughly 5/5 in both upper and lower extremities bilaterally.  Sensation to light touch is intact in both upper and lower extremities bilaterally.      LABORATORY DATA:  Creatinine is 1.18.  Sodium is high at 151, chloride is high at 118.  Electrolytes are normal.  Hemoglobin A1c is 8.8.  White count was 3.2 on admission, currently 10.6, hemoglobin is 11.4, platelets are 352,000.  Urinalysis on admission shows 3 red blood cells and 7 white blood cells.  Urine culture is pending.      I reviewed the CT scan images from 04/14/2018.  Again this shows no hydronephrosis.  There is a 6 mm stone in the lower pole of the right kidney which has been present on previous scans.  There were no stones in the left kidney or in either ureter.  Of note, there are multiple brachy seeds in his prostate.      IMPRESSION:  A 89-year-old gentleman admitted to the hospital for weakness, fatigue, urinary frequency and urgency.   1.  Questionable urinary tract infection.  Urinalysis does not suggest infection.  He has been on a course of cefuroxime and Diflucan for at least the past week.  Could consider stopping antibiotics at this point.   2.  Urinary frequency, urgency/incontinence.  Maybe an ongoing issue or a side effect to his recent infections.  He does have incomplete bladder emptying.  We will try a course of Flomax 0.4 mg daily.  The patient will follow up with Dr. Lambert in 1-2 weeks with a repeat urinalysis and bladder scan to reassess his voiding symptoms.  If no improvement, could consider a trial of oxybutynin ER 5 mg daily.  This may cause more trouble with retention.   3.  Right (6 mm) lower pole calculus, nonobstructive and currently asymptomatic.  Recommend observation for now.  Again, will follow up with Dr. Lambert regarding this issue.   4.  Prostate cancer, treated  with brachytherapy n remote past.  Reviewing his old PSAs, they have been quite low.  Last PSA was 0.49 in 2016.  Dr. Lambert can reassess at followup whether to recheck one or not.         NORA COELHO MD             D: 04/15/2018   T: 04/15/2018   MT: CC      Name:     REGINA CABALLERO   MRN:      [id number]        Account:       [id number]   :      1925           Consult Date:  04/15/2018      Document: [id number]       cc: Oneal Liu MD       [facility]

## 2018-04-15 NOTE — PROVIDER NOTIFICATION
Paged On call Provider for PVR over 150.    Update: New orders for intermittent straight cath, but not needed at this time.

## 2018-04-15 NOTE — PLAN OF CARE
Problem: Patient Care Overview  Goal: Plan of Care/Patient Progress Review  Discharge Planner SLP   Patient plan for discharge: home  Current status: Pt completed bedside swallow evaluation.  Nurse reported pt failed nursing swallow screen.  Pt positioned upright in bed.  Mouth cleaned out, dry due to mouth breathing.  Pt sipped water from cup-6oz and had cough x1 when multiple sip and initiated talking to soon.  Otherwise no overt s/s aspiration even with multiple sips.  Pt did take pill with multiple sips of water with no overt s/s aspiration.  Adequate bolus management with pudding and cracker as well.  No overt s/s aspiration on either of those textures.  Pt did sip water with cracker residue due to dry mouth and did not cough or demonstrate any difficulty managing water and solid in his mouth.  Swallow appears to be wfl at this time.  Educated pt on safe swallow guidelines and reflux precautions and to take single sips of water as safety measure as he gets stronger.  If he has difficulty taking pills with water later on he can take them in applesauce with a sip of water wash.  SLP services not required as swallow wfl at this time.  He should tolerate a regular diet with thin liquids sitting in chair when able, or fully upright in bed, small bites and sips as he gains strength and alternate solids and liquids for dry mouth.  Upright 1 hour after intake as reflux precaution.    Barriers to return to prior living situation: none  Recommendations for discharge: home no SLP services needed  Rationale for recommendations: Swallow wfl at this time.       Entered by: Binh Bello 04/15/2018 10:11 AM

## 2018-04-15 NOTE — PROGRESS NOTES
Urology consult note dictated.    1. Urinary frequency / urgency - may be due to BPH / prostate irritation   - UCx - pending - on Rocephin   - elevated PVR (134-290 mL)   - start Flomax 0.4 mg daily    2. Right (6 mm) lower pole kidney stone - none obstructive - observe    Ramiro Goodrich MD

## 2018-04-15 NOTE — PROVIDER NOTIFICATION
Provider notified because off going RN noted elevated BP of 169/91. Per Flowsheet, pt is currently NPO due to failed swallow screen earlier in the afternoon. Pt did not take scheduled dose of metoprolol due to NPO status. Provider notified for further instruction as BP is trending up to parameter of SBP >180.    Update: Provider advised to monitor BP until morning at this time as BP is also elevated due to infection.

## 2018-04-15 NOTE — PLAN OF CARE
Problem: Patient Care Overview  Goal: Plan of Care/Patient Progress Review  PT: Attempted x 2 this am. With another provider, then in bathroom with nursing for cares.

## 2018-04-15 NOTE — PLAN OF CARE
Problem: Patient Care Overview  Goal: Plan of Care/Patient Progress Review  Outcome: Improving  Pt alert and oriented x 1-2. Up with 1/belt and walker. Continues to have urinary frequency and incontinence-started flomax per urology.  Passed speech eval,on regular diet. BGM's 204 and 240. Tele=paced(dc'd) Neuros unchanged. Impulsive,VPM in room.

## 2018-04-15 NOTE — PHARMACY-ADMISSION MEDICATION HISTORY
Admission medication history interview status for the 4/14/2018  admission is complete. See EPIC admission navigator for prior to admission medications     Medication history source reliability:Moderate    Actions taken by pharmacist (provider contacted, etc):Reviewed discharge summary in Care Everywhere and reviewed pts med list with [facility] Pharmacist who was familiar with pt. Confirmed that Symbicort was recently started. Fluconazole # 30 tabs and Cefuroxime # 20 tabs were new RXs as of 4/11. Potassium citrate RX was filled on 4/10 for #270 tabs    Additional medication history information not noted on PTA med list :Did talk with pt again and he agreed with current med list although still seemed a little confused    Medication reconciliation/reorder completed by provider prior to medication history? N/A    Time spent in this activity: 45 minutes    Prior to Admission medications    Medication Sig Last Dose Taking? Auth Provider   rosuvastatin (CRESTOR) 10 MG tablet Take 10 mg by mouth daily 4/13/2018 at pm Yes Reported, Patient   CEFUROXIME AXETIL PO Take 250 mg by mouth 2 times daily   Yes Reported, Patient   POTASSIUM CITRATE PO Take 10 mEq by mouth 3 times daily (with meals)   Yes Reported, Patient   METOPROLOL TARTRATE PO Take 50 mg by mouth 2 times daily  Yes Unknown, Entered By History   triamcinolone (KENALOG) 0.025 % ointment Apply topically 2 times daily  Yes Unknown, Entered By History   budesonide-formoterol (SYMBICORT) 160-4.5 MCG/ACT Inhaler Inhale 2 puffs into the lungs 2 times daily  Yes Unknown, Entered By History   sitagliptin (JANUVIA) 100 MG tablet Take 100 mg by mouth daily   Reported, Patient   FLUCONAZOLE PO Take 200 mg by mouth daily    Reported, Patient   ipratropium - albuterol 0.5 mg/2.5 mg/3 mL (DUONEB) 0.5-2.5 (3) MG/3ML neb solution Take 1 vial by nebulization every 6 hours as needed for shortness of breath / dyspnea or wheezing   Unknown, Entered By History

## 2018-04-15 NOTE — PLAN OF CARE
Problem: Patient Care Overview  Goal: Plan of Care/Patient Progress Review  Outcome: Improving  VPM. Disoriented to time and situation, [place], frequent hiccups, baseline tremors UEs and LEs. VSS ex. BP elevated to 168/83, MD aware-see note.Pt did not take bedtime Metoprolol due to strict NPO status from failed dysphasia screen, oral swabs declined. Infrequent loose cough. Voiding in BR, incontinent also, contact provider if PVRs over 150ml, PVRs 140, 203, 121, 198 this shift, MD aware-see note. +BS, passing flatus, no BMs this shift. Tele 100% V-paced. Mepilex on coccyx, CDI. Scab on R knee ROMAINE. Denies pain. Ambulating A1 gb/walker. IVF 1/2 NS with 20mEq of KCl at 10ml/hr. q4 BG checks, , 213 with coverage.  Plan for swallow eval, Urology, nutrition  services, spiritual health, PT, OT and wound consults today.

## 2018-04-15 NOTE — PLAN OF CARE
Problem: Patient Care Overview  Goal: Plan of Care/Patient Progress Review  OT: Attempted to see pt for OT eval, pt family requests to reschedule for tomorrow as pt is resting and awaiting enema. Of note, pt prefers to be called Dr. Alexander.

## 2018-04-15 NOTE — PROGRESS NOTES
SPIRITUAL HEALTH SERVICES Progress Note  [facility]    Visited pt per consult. Pt had requested to see a , but our  coverage is currently for emergencies only. Pt's daughter was present. 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 Pt appreciates prayer.  provided supportive listening and prayer. Pt requested that  return. SH will continue to follow as available.      Gracie Ann  Chaplain Resident  Pager: [phone]  Office: [phone]

## 2018-04-16 LAB
ANION GAP SERPL CALCULATED.3IONS-SCNC: 8 MMOL/L (ref 3–14)
ANION GAP SERPL CALCULATED.3IONS-SCNC: 8 MMOL/L (ref 3–14)
BACTERIA SPEC CULT: ABNORMAL
BACTERIA SPEC CULT: ABNORMAL
BUN SERPL-MCNC: 18 MG/DL (ref 7–30)
BUN SERPL-MCNC: 18 MG/DL (ref 7–30)
CALCIUM SERPL-MCNC: 8.1 MG/DL (ref 8.5–10.1)
CALCIUM SERPL-MCNC: 8.1 MG/DL (ref 8.5–10.1)
CHLORIDE SERPL-SCNC: 109 MMOL/L (ref 94–109)
CHLORIDE SERPL-SCNC: 112 MMOL/L (ref 94–109)
CO2 SERPL-SCNC: 23 MMOL/L (ref 20–32)
CO2 SERPL-SCNC: 23 MMOL/L (ref 20–32)
CREAT SERPL-MCNC: 1.04 MG/DL (ref 0.66–1.25)
CREAT SERPL-MCNC: 1.08 MG/DL (ref 0.66–1.25)
ERYTHROCYTE [DISTWIDTH] IN BLOOD BY AUTOMATED COUNT: 21.7 % (ref 10–15)
GFR SERPL CREATININE-BSD FRML MDRD: 64 ML/MIN/1.7M2
GFR SERPL CREATININE-BSD FRML MDRD: 67 ML/MIN/1.7M2
GLUCOSE BLDC GLUCOMTR-MCNC: 167 MG/DL (ref 70–99)
GLUCOSE BLDC GLUCOMTR-MCNC: 178 MG/DL (ref 70–99)
GLUCOSE BLDC GLUCOMTR-MCNC: 186 MG/DL (ref 70–99)
GLUCOSE BLDC GLUCOMTR-MCNC: 236 MG/DL (ref 70–99)
GLUCOSE BLDC GLUCOMTR-MCNC: 351 MG/DL (ref 70–99)
GLUCOSE SERPL-MCNC: 209 MG/DL (ref 70–99)
GLUCOSE SERPL-MCNC: 237 MG/DL (ref 70–99)
HCT VFR BLD AUTO: 32.5 % (ref 40–53)
HGB BLD-MCNC: 10.1 G/DL (ref 13.3–17.7)
Lab: ABNORMAL
MCH RBC QN AUTO: 24.2 PG (ref 26.5–33)
MCHC RBC AUTO-ENTMCNC: 31.1 G/DL (ref 31.5–36.5)
MCV RBC AUTO: 78 FL (ref 78–100)
PLATELET # BLD AUTO: 263 10E9/L (ref 150–450)
POTASSIUM SERPL-SCNC: 3.8 MMOL/L (ref 3.4–5.3)
POTASSIUM SERPL-SCNC: 3.8 MMOL/L (ref 3.4–5.3)
RBC # BLD AUTO: 4.17 10E12/L (ref 4.4–5.9)
SODIUM SERPL-SCNC: 140 MMOL/L (ref 133–144)
SODIUM SERPL-SCNC: 143 MMOL/L (ref 133–144)
SPECIMEN SOURCE: ABNORMAL
WBC # BLD AUTO: 7 10E9/L (ref 4–11)

## 2018-04-16 PROCEDURE — 25000128 H RX IP 250 OP 636: Performed by: INTERNAL MEDICINE

## 2018-04-16 PROCEDURE — 25000131 ZZH RX MED GY IP 250 OP 636 PS 637: Mod: GY | Performed by: INTERNAL MEDICINE

## 2018-04-16 PROCEDURE — 12000000 ZZH R&B MED SURG/OB

## 2018-04-16 PROCEDURE — 25000132 ZZH RX MED GY IP 250 OP 250 PS 637: Mod: GY | Performed by: INTERNAL MEDICINE

## 2018-04-16 PROCEDURE — 36415 COLL VENOUS BLD VENIPUNCTURE: CPT | Performed by: INTERNAL MEDICINE

## 2018-04-16 PROCEDURE — 25000131 ZZH RX MED GY IP 250 OP 636 PS 637: Mod: GY

## 2018-04-16 PROCEDURE — 25000132 ZZH RX MED GY IP 250 OP 250 PS 637: Mod: GY | Performed by: UROLOGY

## 2018-04-16 PROCEDURE — 99207 ZZC CDG-MDM COMPONENT: MEETS LOW - DOWN CODED: CPT | Performed by: INTERNAL MEDICINE

## 2018-04-16 PROCEDURE — G0463 HOSPITAL OUTPT CLINIC VISIT: HCPCS

## 2018-04-16 PROCEDURE — 99232 SBSQ HOSP IP/OBS MODERATE 35: CPT | Performed by: INTERNAL MEDICINE

## 2018-04-16 PROCEDURE — 80048 BASIC METABOLIC PNL TOTAL CA: CPT | Performed by: INTERNAL MEDICINE

## 2018-04-16 PROCEDURE — 40000901 ZZH STATISTIC WOC PT EDUCATION, 0-15 MIN

## 2018-04-16 PROCEDURE — 85027 COMPLETE CBC AUTOMATED: CPT | Performed by: INTERNAL MEDICINE

## 2018-04-16 PROCEDURE — A9270 NON-COVERED ITEM OR SERVICE: HCPCS | Mod: GY | Performed by: INTERNAL MEDICINE

## 2018-04-16 PROCEDURE — 00000146 ZZHCL STATISTIC GLUCOSE BY METER IP

## 2018-04-16 RX ORDER — AMOXICILLIN 875 MG
875 TABLET ORAL ONCE
Status: COMPLETED | OUTPATIENT
Start: 2018-04-16 | End: 2018-04-16

## 2018-04-16 RX ORDER — DEXTROSE MONOHYDRATE 25 G/50ML
25-50 INJECTION, SOLUTION INTRAVENOUS
Status: DISCONTINUED | OUTPATIENT
Start: 2018-04-16 | End: 2018-04-16

## 2018-04-16 RX ORDER — NICOTINE POLACRILEX 4 MG
15-30 LOZENGE BUCCAL
Status: DISCONTINUED | OUTPATIENT
Start: 2018-04-16 | End: 2018-04-16

## 2018-04-16 RX ADMIN — METOPROLOL TARTRATE 50 MG: 50 TABLET ORAL at 21:15

## 2018-04-16 RX ADMIN — POTASSIUM CHLORIDE AND SODIUM CHLORIDE: 450; 150 INJECTION, SOLUTION INTRAVENOUS at 01:41

## 2018-04-16 RX ADMIN — MULTIPLE VITAMINS W/ MINERALS TAB 1 TABLET: TAB at 09:18

## 2018-04-16 RX ADMIN — FLUTICASONE FUROATE AND VILANTEROL TRIFENATATE 1 PUFF: 200; 25 POWDER RESPIRATORY (INHALATION) at 10:04

## 2018-04-16 RX ADMIN — INSULIN ASPART 2 UNITS: 100 INJECTION, SOLUTION INTRAVENOUS; SUBCUTANEOUS at 17:36

## 2018-04-16 RX ADMIN — TAMSULOSIN HYDROCHLORIDE 0.4 MG: 0.4 CAPSULE ORAL at 09:18

## 2018-04-16 RX ADMIN — POTASSIUM CHLORIDE AND SODIUM CHLORIDE: 450; 150 INJECTION, SOLUTION INTRAVENOUS at 16:37

## 2018-04-16 RX ADMIN — INSULIN ASPART 1 UNITS: 100 INJECTION, SOLUTION INTRAVENOUS; SUBCUTANEOUS at 10:02

## 2018-04-16 RX ADMIN — METOPROLOL TARTRATE 50 MG: 50 TABLET ORAL at 09:18

## 2018-04-16 RX ADMIN — AMOXICILLIN 875 MG: 875 TABLET, COATED ORAL at 21:15

## 2018-04-16 RX ADMIN — INSULIN GLARGINE 15 UNITS: 100 INJECTION, SOLUTION SUBCUTANEOUS at 17:41

## 2018-04-16 RX ADMIN — INSULIN ASPART 5 UNITS: 100 INJECTION, SOLUTION INTRAVENOUS; SUBCUTANEOUS at 12:16

## 2018-04-16 RX ADMIN — POTASSIUM CHLORIDE AND SODIUM CHLORIDE: 450; 150 INJECTION, SOLUTION INTRAVENOUS at 09:21

## 2018-04-16 RX ADMIN — CEFUROXIME AXETIL 250 MG: 250 TABLET ORAL at 09:18

## 2018-04-16 ASSESSMENT — ACTIVITIES OF DAILY LIVING (ADL): PREVIOUS_RESPONSIBILITIES: MEDICATION MANAGEMENT;DRIVING

## 2018-04-16 NOTE — PLAN OF CARE
Problem: Patient Care Overview  Goal: Plan of Care/Patient Progress Review  Outcome: No Change  Still confused to time and sometimes to situation; does know he is in the hospital.  Up with assist to chair; declined x 2 to ambulate; needs encouragement to increase activity.  Appetite poor but is taking 100% of supplements sent (otherwise eating only a few bites of food and taking fair amount of liquids).  Skin intact with some blanchable redness to  Coccyx area, mepilex intact; positioning to side and chair cushion supplied.  Did report some pain to left abdomen this afternoon, declining tylenol, accepting heat application; did relieve within 15 minutes.  Incontinent of urine, use of urinal with assist, PVR's negative.  Lungs diminished, infrequent cough.

## 2018-04-16 NOTE — PLAN OF CARE
Problem: Patient Care Overview  Goal: Plan of Care/Patient Progress Review  PT: Attempted to see patient for PT evaluation but patient having L lower quadrant abdominal pain; Unable to tolerate participation at time of scheduled session. Nurse requesting am eval if possible for 4/17/18.

## 2018-04-16 NOTE — PROGRESS NOTES
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  On admission, pt afebrile,slightly hypertensive. Head cT, cXR both negative. UA shows mild pyuria- pt presents on cefuroxime      Brayden Assessment and sub scores:   Brayden Score  Av.4  Min: 14  Max: 19      Positioning: turning with pillows    Mattress: atmos air      Moisture Management: intermittent UIC / voices not issue with FIC    Catheter secured? NA      Current Diet / Nutrition:     Active Diet Order      Moderate Consistent CHO Diet    Labs:   Recent Labs   Lab Test  18   0730  04/15/18   1025  18   1602   ALBUMIN   --   2.9*  2.8*   HGB  10.1*  12.2*  11.4*   WBC  7.0  9.9  10.6   A1C   --    --   8.8*         Skin Assessment (location): bilateral groin, base of scrotum, and yun-anal skin      Skin: Deep reddish confluent area extending from base of bilateral groin to yun-anal skin. Faint satellite                Lesions noted             Scrotum with bright erythema    Temperature  normal     Drainage:  none    Odor: none    Pain:  tenderness          Intervention:     Patient's chart evaluated.      Assessed:     Orders in Epic    Supplies  Ordered in Epic    Discussed plan of care with Nursing          Assessment:     Bilateral groin; yun-anal skin and base of scrotum:  Probable candida        Plan:   Nursing to notify the Provider(s) and re-consult the [facility] Nurse if skin deteriorate(s).    Skin care: Coccyx, bilateral groin,  base of scrotum, yun-anal skin and    1.  Antifungal powder to groin scrotum and yun-anal skin TID and prn        with diaper changes  2.  Clean gently with Sheila Cleanse and protect  3.  During hospitalization: mepilex sacral to coccyx  4.  PIP measures: Turning, heel suspension, HOB below 30 degrees         Mobilize as medically indicated    WOC Nurse will return: weekly and prn

## 2018-04-16 NOTE — PLAN OF CARE
Problem: Patient Care Overview  Goal: Plan of Care/Patient Progress Review  Outcome: No Change  VSS. RA. Disoriented to time, forgetful, confused. Other neuros intact. Denies pain. Diminished LS. Incontinent at times, another loose BM following enema today. Mepilex to coccyx. Up Ax1 GB walker, chair for dinner, fair appetite. , lantus started. Encouraging fluids. Pt refused ambulation in halls. Continue to monitor.

## 2018-04-16 NOTE — PROGRESS NOTES
"Urology     Feeling \"much better \" this am, burning with urination is gone. His longstanding incontinence persists, using multiple pads/day however his residuals are acceptable: 100-150 ml range. 10 ml this am.     Per Dr Goodrich's note, would consider discontinuing antibiotics as cultures to date are negative. Follow up with Dr Lambert re: upper tract nonobstructive stone later this summer.     Carmine STEIN  "

## 2018-04-16 NOTE — PLAN OF CARE
Problem: Patient Care Overview  Goal: Plan of Care/Patient Progress Review  Outcome: No Change  VPM, impulsive when needs to void. Oriented to self only, Neuros otherwise intact.VSS, BP elevated at times, 163/98.  Infrequent loose, nonproductive cough. Mepilex on coccyx CDI. Scab on R knee, ROMAINE, no drainage, Scaly rash on inner R shin ROMAINE, red/pink, no drainage. Incontinent, adequate UOP. IVF at 125ml/hr. Tolerating Mod carb diet, encourage fluids. , 186. Ambulating A1 gb/walker, encourage ambulation. Denies pain. Plan for WOC consult today. DC pending. Continue to monitor.

## 2018-04-16 NOTE — PROGRESS NOTES
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  On admission, pt afebrile,slightly hypertensive. Head cT, cXR both negative. UA shows mild pyuria- pt presents on cefuroxime     /Possible UTI:   UA not have significant pyuria. Presents on diflucan and cefuroxime per his primary ID provider.  Has had some dysuria and flank pain but hx is vague. CT abd without contrast shows 6 mm R renal stone. No ureteral stone or hydronephrosis.  Multiple renal cysts. Slight hematuria.   -hold antifungal  - pt refuses rocephin. Started cefuroxime   UCx growing 10-50K Enterococcus, sens pending per micro lab  -restart diflucan if yeast on cx  - bld cx- ngtd  - urology associates consult.   -fluids     DM-2/Hyperglycemia  - running in 200 range at home.  Associated dehydration and encephalopathy. Taking januvia 100mg qd at home.  No longer on metformin.    Not eating well.     Today: improved but still high  - IVFs  - dm diet  - ISS with po  -1 unit per carb novolog- add today  - started lantus- increase to 15 units X1 now  - hold januvia  -add 1 unit per carb     Metabolic Encephalopathy: acute  2/2 to dehydration, possible UTI, hyperglycemia.    Speech improved. More alert but remains confused/disoriented.   Today: improving but still confused with nonfocal neuro exam. Wife agrees.     -ivfs  -am labs  -tx hyperglycemia  -follow lytes  -tx possible UTI.   -delirium protocol  -mobilize       Severe malnutrition   -Plus2 Shakes between meals  -ordered daily Thera-Vit-M    abd pain; this afternoon. Now resolved. Nl abd exam this afternoon. Follow      Failure to thrive/Anorexia  - consider  possible persisting urinary tract infection  -abx emperically- presents on abx  -tx hyperglycemia  - nutrition consult  -albumin  -PT, OT  -likely needs TCU. Discussed with wife today  -     Dehydration:   2/2 to hyperglycemia, poor po intake   -ivfs- may decrease rate  - bmp this am, now and in am  Control BS     Anemia:  Low MCV.    Daily cbc  Evaluate further with anemia panel. No active bleeding.    Stop lovenox      Hypernatremia: 2/2 dehydration. Na 144 on presentation. Increased to 151 post fluid bolus. Calculated free water deficit is 2.7 liters. Replace 1/2 free water in first 24 hours. 1/2 NS given hyperglycemia,     Resolved after correcting for elevated BS.   Increased 1/2 NS to 125 cc- recheck bmp now  Am bmp  Push free fluids, discussed with RN    Grade 1 pressure ulcer: coccyx. present on admission.  Wound care consulted and following.  Topical covering.       HTN: decent control. change from iv to po metoprolol    DVT Prophylaxis: mechanical prophylaxis. Added lovenox but stop now given low mcv Hb and hb drop.     Code Status: Full Code- discussed with pt wife     Disposition: Expected discharge in >2 days    Naga Denney MD  Text Page  (7am to 6pm)  Interval History   Feels better, stronger.   Had episode of left sided pain for 1 hour or so this afternoon.  Seen this am and then again this afternoon. Now pain completely resolved.   No n/v/d No cp or sob.       -Data reviewed today: I reviewed all new labs and imaging results over the last 24 hours.    Physical Exam   Temp: 98  F (36.7  C) Temp src: Oral BP: 106/56   Heart Rate: 66 Resp: 20 SpO2: 95 % O2 Device: None (Room air)    Vitals:    04/15/18 0656 04/16/18 0630   Weight: 58.9 kg (129 lb 13.6 oz) 58.9 kg (129 lb 13.6 oz)     Vital Signs with Ranges  Temp:  [97.4  F (36.3  C)-98  F (36.7  C)] 98  F (36.7  C)  Heart Rate:  [66-77] 66  Resp:  [18-20] 20  BP: (106-163)/(56-98) 106/56  SpO2:  [94 %-97 %] 95 %  I/O last 3 completed  shifts:  In: 2340 [P.O.:565; I.V.:1775]  Out: 150 [Urine:150]    Constitutional: nad, appears comfortable, nontoxic  Eyes: eomi, perrla  HEENT: nl op,dry MM  Respiratory: cTAB  Cardiovascular: RRR, no r/g/m  Chest: pacemaker Left upper chest  GI: soft, NT ND, no hepatosplenomegaly, no cva tenderness (am and afternoon exam)  Skin: no rash, echymosis on knees, grade 1 pressure ulcer coccyx present on admission. Slightly redness, no purulence  Musculoskeletal: no focal swelling or redness or pain with rom  Neurologic: disoriented to place, year, date. Oriented to name and knows in hospital today but over all his mentation is more clear.  Speech clear.   [place]. Clear speech today, less tremor of head and arms, strength 5/5 extrem X 4.   Psychiatric: calm, cooperative. No agitation    Medications     0.45% sodium chloride + KCl 20 mEq/L 125 mL/hr at 04/16/18 0921       insulin glargine  15 Units Subcutaneous Once     tamsulosin  0.4 mg Oral Daily     multivitamin, therapeutic with minerals  1 tablet Oral Daily     metoprolol tartrate  50 mg Oral BID     insulin aspart  1-7 Units Subcutaneous TID AC     insulin aspart  1-5 Units Subcutaneous At Bedtime     enoxaparin  40 mg Subcutaneous Q24H     fluticasone-vilanterol  1 puff Inhalation Daily       Data     Recent Labs  Lab 04/16/18  0730 04/15/18  1620 04/15/18  1025 04/14/18  1602 04/14/18  1020   WBC 7.0  --  9.9 10.6 13.2*   HGB 10.1*  --  12.2* 11.4* 12.4*   MCV 78  --  79 78 77*     --  337 352 400    147* 145* 151* 144   POTASSIUM 3.8 3.7 3.6 3.5 4.2   CHLORIDE 112* 114* 113* 118* 110*   CO2 23 25 22 24 22   BUN 18 18 16 18 20   CR 1.08 1.11 1.16 1.18 1.33*   ANIONGAP 8 8 10 9 12   GURU 8.1* 8.2* 8.6 8.5 9.3   * 264* 184* 242* 439*   ALBUMIN  --   --  2.9* 2.8* 3.1*   PROTTOTAL  --   --  7.4 6.8 7.7   BILITOTAL  --   --  0.7 0.9 1.0   ALKPHOS  --   --  142 146 165*   ALT  --   --  32 33 40   AST  --   --  20 12 11   TROPI  --   --   --   --  <0.015        Imaging:   No results found for this or any previous visit (from the past 24 hour(s)).

## 2018-04-16 NOTE — PLAN OF CARE
Problem: Patient Care Overview  Goal: Plan of Care/Patient Progress Review  OT: Partial eval completed. Cut short due to breakfast arrival.

## 2018-04-16 NOTE — PROVIDER NOTIFICATION
Patient requesting pain medication for left abdominal pain.  He is passing flatus, abdomen non distended, some tenderness to left only; last BM yesterday.  He is refusing tylenol.  Warm pack applied.  Call out to MD to inquire on something else, toradol x1?  Patient also has increase in confusion this afternoon.    1503 addendum:  Patient now stating pain is gone.  He expresses desire to have a medication on hand prn other than tylenol.

## 2018-04-17 ENCOUNTER — APPOINTMENT (OUTPATIENT)
Dept: OCCUPATIONAL THERAPY | Facility: CLINIC | Age: 83
DRG: 637 | End: 2018-04-17
Attending: INTERNAL MEDICINE
Payer: MEDICARE

## 2018-04-17 ENCOUNTER — APPOINTMENT (OUTPATIENT)
Dept: CT IMAGING | Facility: CLINIC | Age: 83
DRG: 637 | End: 2018-04-17
Attending: INTERNAL MEDICINE
Payer: MEDICARE

## 2018-04-17 ENCOUNTER — APPOINTMENT (OUTPATIENT)
Dept: PHYSICAL THERAPY | Facility: CLINIC | Age: 83
DRG: 637 | End: 2018-04-17
Payer: MEDICARE

## 2018-04-17 LAB
ANION GAP SERPL CALCULATED.3IONS-SCNC: 7 MMOL/L (ref 3–14)
BUN SERPL-MCNC: 15 MG/DL (ref 7–30)
CALCIUM SERPL-MCNC: 8.5 MG/DL (ref 8.5–10.1)
CHLORIDE SERPL-SCNC: 109 MMOL/L (ref 94–109)
CO2 SERPL-SCNC: 24 MMOL/L (ref 20–32)
CREAT SERPL-MCNC: 1.06 MG/DL (ref 0.66–1.25)
ERYTHROCYTE [DISTWIDTH] IN BLOOD BY AUTOMATED COUNT: 22 % (ref 10–15)
FERRITIN SERPL-MCNC: 26 NG/ML (ref 26–388)
FOLATE SERPL-MCNC: 7.5 NG/ML
GFR SERPL CREATININE-BSD FRML MDRD: 65 ML/MIN/1.7M2
GLUCOSE BLDC GLUCOMTR-MCNC: 114 MG/DL (ref 70–99)
GLUCOSE BLDC GLUCOMTR-MCNC: 171 MG/DL (ref 70–99)
GLUCOSE BLDC GLUCOMTR-MCNC: 185 MG/DL (ref 70–99)
GLUCOSE BLDC GLUCOMTR-MCNC: 229 MG/DL (ref 70–99)
GLUCOSE BLDC GLUCOMTR-MCNC: 98 MG/DL (ref 70–99)
GLUCOSE SERPL-MCNC: 121 MG/DL (ref 70–99)
HCT VFR BLD AUTO: 34.5 % (ref 40–53)
HGB BLD-MCNC: 10.8 G/DL (ref 13.3–17.7)
MCH RBC QN AUTO: 24.1 PG (ref 26.5–33)
MCHC RBC AUTO-ENTMCNC: 31.3 G/DL (ref 31.5–36.5)
MCV RBC AUTO: 77 FL (ref 78–100)
PLATELET # BLD AUTO: 278 10E9/L (ref 150–450)
POTASSIUM SERPL-SCNC: 3.9 MMOL/L (ref 3.4–5.3)
RBC # BLD AUTO: 4.49 10E12/L (ref 4.4–5.9)
SODIUM SERPL-SCNC: 140 MMOL/L (ref 133–144)
TRANSFERRIN SERPL-MCNC: 191 MG/DL (ref 210–360)
VIT B12 SERPL-MCNC: 648 PG/ML (ref 193–986)
WBC # BLD AUTO: 7.6 10E9/L (ref 4–11)

## 2018-04-17 PROCEDURE — 40000193 ZZH STATISTIC PT WARD VISIT

## 2018-04-17 PROCEDURE — 99232 SBSQ HOSP IP/OBS MODERATE 35: CPT | Performed by: INTERNAL MEDICINE

## 2018-04-17 PROCEDURE — 25000132 ZZH RX MED GY IP 250 OP 250 PS 637: Mod: GY | Performed by: INTERNAL MEDICINE

## 2018-04-17 PROCEDURE — 82746 ASSAY OF FOLIC ACID SERUM: CPT | Performed by: INTERNAL MEDICINE

## 2018-04-17 PROCEDURE — 99207 ZZC CDG-MDM COMPONENT: MEETS LOW - DOWN CODED: CPT | Performed by: INTERNAL MEDICINE

## 2018-04-17 PROCEDURE — 97116 GAIT TRAINING THERAPY: CPT | Mod: GP

## 2018-04-17 PROCEDURE — 12000000 ZZH R&B MED SURG/OB

## 2018-04-17 PROCEDURE — 25000131 ZZH RX MED GY IP 250 OP 636 PS 637: Mod: GY | Performed by: INTERNAL MEDICINE

## 2018-04-17 PROCEDURE — 00000146 ZZHCL STATISTIC GLUCOSE BY METER IP

## 2018-04-17 PROCEDURE — 97161 PT EVAL LOW COMPLEX 20 MIN: CPT | Mod: GP

## 2018-04-17 PROCEDURE — 82607 VITAMIN B-12: CPT | Performed by: INTERNAL MEDICINE

## 2018-04-17 PROCEDURE — 70450 CT HEAD/BRAIN W/O DYE: CPT

## 2018-04-17 PROCEDURE — 84466 ASSAY OF TRANSFERRIN: CPT | Performed by: INTERNAL MEDICINE

## 2018-04-17 PROCEDURE — 97110 THERAPEUTIC EXERCISES: CPT | Mod: GP

## 2018-04-17 PROCEDURE — 82728 ASSAY OF FERRITIN: CPT | Performed by: INTERNAL MEDICINE

## 2018-04-17 PROCEDURE — 80048 BASIC METABOLIC PNL TOTAL CA: CPT | Performed by: INTERNAL MEDICINE

## 2018-04-17 PROCEDURE — 25000132 ZZH RX MED GY IP 250 OP 250 PS 637: Mod: GY | Performed by: UROLOGY

## 2018-04-17 PROCEDURE — A9270 NON-COVERED ITEM OR SERVICE: HCPCS | Mod: GY | Performed by: INTERNAL MEDICINE

## 2018-04-17 PROCEDURE — 85027 COMPLETE CBC AUTOMATED: CPT | Performed by: INTERNAL MEDICINE

## 2018-04-17 PROCEDURE — 36415 COLL VENOUS BLD VENIPUNCTURE: CPT | Performed by: INTERNAL MEDICINE

## 2018-04-17 PROCEDURE — 25000131 ZZH RX MED GY IP 250 OP 636 PS 637: Mod: GY

## 2018-04-17 PROCEDURE — 25000128 H RX IP 250 OP 636: Performed by: INTERNAL MEDICINE

## 2018-04-17 PROCEDURE — 97535 SELF CARE MNGMENT TRAINING: CPT | Mod: GO

## 2018-04-17 PROCEDURE — 40000133 ZZH STATISTIC OT WARD VISIT

## 2018-04-17 PROCEDURE — 97165 OT EVAL LOW COMPLEX 30 MIN: CPT | Mod: GO

## 2018-04-17 RX ORDER — AMOXICILLIN 875 MG
875 TABLET ORAL EVERY 12 HOURS SCHEDULED
Status: DISCONTINUED | OUTPATIENT
Start: 2018-04-17 | End: 2018-04-19 | Stop reason: HOSPADM

## 2018-04-17 RX ADMIN — POTASSIUM CHLORIDE AND SODIUM CHLORIDE: 450; 150 INJECTION, SOLUTION INTRAVENOUS at 10:24

## 2018-04-17 RX ADMIN — AMOXICILLIN 875 MG: 875 TABLET, COATED ORAL at 20:20

## 2018-04-17 RX ADMIN — INSULIN ASPART 2 UNITS: 100 INJECTION, SOLUTION INTRAVENOUS; SUBCUTANEOUS at 17:51

## 2018-04-17 RX ADMIN — METOPROLOL TARTRATE 50 MG: 50 TABLET ORAL at 20:20

## 2018-04-17 RX ADMIN — MULTIPLE VITAMINS W/ MINERALS TAB 1 TABLET: TAB at 09:01

## 2018-04-17 RX ADMIN — AMOXICILLIN 875 MG: 875 TABLET, COATED ORAL at 09:46

## 2018-04-17 RX ADMIN — METOPROLOL TARTRATE 50 MG: 50 TABLET ORAL at 09:01

## 2018-04-17 RX ADMIN — FLUTICASONE FUROATE AND VILANTEROL TRIFENATATE 1 PUFF: 200; 25 POWDER RESPIRATORY (INHALATION) at 11:28

## 2018-04-17 RX ADMIN — INSULIN GLARGINE 13 UNITS: 100 INJECTION, SOLUTION SUBCUTANEOUS at 20:21

## 2018-04-17 RX ADMIN — INSULIN ASPART 1 UNITS: 100 INJECTION, SOLUTION INTRAVENOUS; SUBCUTANEOUS at 12:07

## 2018-04-17 RX ADMIN — TAMSULOSIN HYDROCHLORIDE 0.4 MG: 0.4 CAPSULE ORAL at 09:01

## 2018-04-17 ASSESSMENT — ACTIVITIES OF DAILY LIVING (ADL): PREVIOUS_RESPONSIBILITIES: MEDICATION MANAGEMENT

## 2018-04-17 NOTE — PROGRESS NOTES
UROLOGY     No complaints this AM. Recalls only mild frequency, no dysuria. Continued urinary incontinence.   UCx now with 10-50K Enterococcus.   BCx NGTD     Vitals and labs stable  No abdominal distention or tenderness    A/P: Urinary frequency, dysuria-- resolving   Abx per hospital team   Continue flomax   Follow up with Dr. Lambert as outpatient in 1-2 weeks     Tana Dahl PA-C  [facility]  [address] [place] [address]  [phone]  [email or web address]  Text Page (7am to 5pm)

## 2018-04-17 NOTE — PLAN OF CARE
Problem: Patient Care Overview  Goal: Plan of Care/Patient Progress Review  Outcome: Improving  Pt intermittently confused. Oriented to person and place only. Pleasant and more alert with clear speech today. Wound care orders in place. Coccyx covered with Sacral mepilex and cleaned with Sheila cleanse. Blanchable red areas on left buttocks, spine, and right calf. Turn and reposition q2hr when in bed. Shift weight when in chair. Miconazole to groin area. BG remaining high. One time dose of Lantus.and sliding scale insulin given. Carb Counting insulin given.  A-1 w/ GB/ walker. Diminished lungs with infrequent cough. Sputum is clear and frothy. BP elevated today 137/60, 112/54. To D/C in 1-2 days to home vs TCU.    Update: telephone read back order for Amoxicillin 875mg PO x1.  MD to adjust further Abx tomorrow.

## 2018-04-17 NOTE — PLAN OF CARE
Problem: Patient Care Overview  Goal: Plan of Care/Patient Progress Review  PT:  Discharge Planner PT   Patient plan for discharge: Return home per spouse and pt  Current status: Orders received, eval completed, treatment initiated. Pt admitted with weakness, metabolic encephalopathy, dysuria, hyperglycemia. Prior to admit pt was living with spouse in a house, uses a 4ww or cane, independent with mobility. Pt has a chair lift in the home to the 2nd level. Currently requires SBA for supine to/from sit, CGA sit to/from stand, CGA for gait of 150 ft with FWW with dec balance and poor balance with turning. Pt appeared confused throughout session and needed 1 step cues for best performance, otherwise was slow or did not respond. Participated in LE strengthening with cues needed. Pt demonstrates dec strength, balance, confusion, dec activity tolerance and difficulty ambulating and transferring and would benefit from skilled PT services in order to improve this.  Barriers to return to prior living situation: Falls risk, needs assist with all mobility, confusion  Recommendations for discharge: TCU  Rationale for recommendations: Pt would benefit from continued PT to improve strength, balance, mobility to increase independence, reduce falls risk and increase safety before returning home.       Entered by: Leighann Gamble 04/17/2018 11:08 AM

## 2018-04-17 NOTE — PROGRESS NOTES
04/17/18 1053   Quick Adds   Type of Visit Initial PT Evaluation   Living Environment   Lives With spouse   Living Arrangements house   Home Accessibility stairs to enter home;stairs within home  (stair chair lift to 2nd level)   Number of Stairs to Enter Home 4   Number of Stairs Within Home 12  (chair lift)   Stair Railings at Home inside, present on right side   Self-Care   Usual Activity Tolerance moderate   Current Activity Tolerance fair   Regular Exercise no   Equipment Currently Used at Home cane, straight;walker, rolling   Functional Level Prior   Ambulation 1-->assistive equipment   Transferring 1-->assistive equipment   Fall history within last six months no   Number of times patient has fallen within last six months 0   General Information   Onset of Illness/Injury or Date of Surgery - Date 04/14/18   Referring Physician Naga Denney MD   Patient/Family Goals Statement Return home   Pertinent History of Current Problem (include personal factors and/or comorbidities that impact the POC) Admitted with weakness, hyperglycemia, dysuria, metabolic encephalopathy.   Precautions/Limitations fall precautions   Weight-Bearing Status - LLE full weight-bearing   Weight-Bearing Status - RLE full weight-bearing   Cognitive Status Examination   Orientation person   Level of Consciousness alert;confused   Follows Commands and Answers Questions 75% of the time;able to follow single-step instructions   Personal Safety and Judgment impaired   Memory impaired   Pain Assessment   Patient Currently in Pain No   Posture    Posture Forward head position;Protracted shoulders;Kyphosis   Range of Motion (ROM)   ROM Quick Adds No deficits were identified   Strength   Strength Comments B hip flex 3+/5, knee ext 3+/5, DF 4/5   Bed Mobility   Bed Mobility Comments Supine to sit with SBA   Transfer Skills   Transfer Comments Sit to stand with FWW and CGA from bed and chair   Gait   Gait Comments Pt amb 10 ft with FWW and CGA,  "flexed posture, short step length and holding walker too far away   Balance   Balance Comments Balance unsteady with gait, especially with turns   General Therapy Interventions   Planned Therapy Interventions bed mobility training;gait training;neuromuscular re-education;strengthening;transfer training   Clinical Impression   Criteria for Skilled Therapeutic Intervention yes, treatment indicated   PT Diagnosis Difficulty ambulating   Influenced by the following impairments Dec strength, balance, activity tolerance, confusion   Functional limitations due to impairments Difficulty ambulating and transferring   Clinical Presentation Stable/Uncomplicated   Clinical Presentation Rationale medically improving   Clinical Decision Making (Complexity) Low complexity   Therapy Frequency` 5 times/week   Predicted Duration of Therapy Intervention (days/wks) 1 week   Anticipated Discharge Disposition Transitional Care Facility   Risk & Benefits of therapy have been explained Yes   Patient, Family & other staff in agreement with plan of care Yes   [facility] TM \"6 Clicks\"   2016, Trustees of [facility], under license to [employer].  All rights reserved.   6 Clicks Short Forms Basic Mobility Inpatient Short Form   [facility] AM-PAC  \"6 Clicks\" V.2 Basic Mobility Inpatient Short Form   1. Turning from your back to your side while in a flat bed without using bedrails? 4 - None   2. Moving from lying on your back to sitting on the side of a flat bed without using bedrails? 3 - A Little   3. Moving to and from a bed to a chair (including a wheelchair)? 3 - A Little   4. Standing up from a chair using your arms (e.g., wheelchair, or bedside chair)? 3 - A Little   5. To walk in hospital room? 3 - A Little   6. Climbing 3-5 steps with a railing? 2 - A Lot   Basic Mobility Raw Score (Score out of 24.Lower scores equate to lower levels of function) 18   Total Evaluation Time   Total Evaluation Time (Minutes) " 15

## 2018-04-17 NOTE — PLAN OF CARE
Problem: Patient Care Overview  Goal: Plan of Care/Patient Progress Review  Outcome: No Change  Patient had VSS, denied any chest pain and SOB. Patient was constantly trying to urinate in urinal all night, was mostly incontinent. Seems that patient is dribbling at all times. Denies any flank pain. Confused and forgetful, knows name and place. Rash in groin area, antifungal powder applied. Coccyx dressing intact, coccyx blanchable. Scattered bruises and scabs, suspect falls but patient unsure. BGM was 98. Urology following. Continue to monitor.

## 2018-04-17 NOTE — PLAN OF CARE
Problem: Patient Care Overview  Goal: Plan of Care/Patient Progress Review  OT: Eval completed, treatment initiated. Pt admitted on 4/14 with 2 weeks of weakness, dysuria, urinary frequency, anorexia and generalized weakness with hyperglycemia on admission.     Discharge Planner OT   Patient plan for discharge: Home with assist   Current status: Min A required for functional transfers using FWW. Verbal cues required throughout session for safety purposes. Pt stood at sink while completing g/h tasks with close CGA and verbal cues to stand tall.   Barriers to return to prior living situation: Cognition, generalized deconditioning, functional transfers  Recommendations for discharge: TCU  Rationale for recommendations: Pt requires many verbal cues for safety with functional transfers as well as assist to complete ADL/IADLs        Entered by: Catrina Rain 04/17/2018 10:26 AM

## 2018-04-17 NOTE — PLAN OF CARE
Problem: Patient Care Overview  Goal: Plan of Care/Patient Progress Review  Outcome: Improving  Somewhat stronger today; still confused at times, especially with date/time.  Appetite improved, taking supplements.  Up with OT, PT, ambulated laguerre; also in chair for meals and to the bathroom.  Continues with incontinence, states he has no control, dribbles at times; offered an external incontinence device, declined; reddened area on buttock, stable, intact, miconazole to groin and scrotum; lying off back when in bed.  Denies any pain.  CT head negative for any acute changes.  Neuros stable.  BG's 114,185; carbs covered.  Family interacted with physician, discussed home is goal at discharge with home care RN and PT.  Daughter who is a nurse states she will be available to assist at home as well.  Oral antibiotic restarted this morning.

## 2018-04-17 NOTE — PROGRESS NOTES
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  On admission, pt afebrile,slightly hypertensive. Head cT, cXR both negative. UA shows mild pyuria- pt presents on cefuroxime     /Possible UTI:   UA not have significant pyuria. Presents on diflucan and cefuroxime per his primary ID provider.  Has had some dysuria and flank pain but hx is vague. CT abd without contrast shows 6 mm R renal stone. No ureteral stone or hydronephrosis.  Multiple renal cysts. Slight hematuria.   -hold antifungal    UCx growing 10-50K Enterococcus, sens to ampicllin (started last night given ucx results)- continue ampicillin for limited course  -restart diflucan if yeast on cx  - bld cx- ngtd  - [facility] consult.   -     DM-2/Hyperglycemia  - running in 200 range at home.  Associated dehydration and encephalopathy. Taking januvia 100mg qd at home.  No longer on metformin.    Not eating well.     Today: improved but still high    - dm diet  - ISS with po  -1 unit per carb novolog- add today  - started lantus-  15 units X1 now again  - hold januvia  -add 1 unit per carb  -discussed plan with pt  -daily home care possible per family     Metabolic Encephalopathy: acute  2/2 to dehydration, possible UTI, hyperglycemia.    Speech improved. More alert but remains confused/disoriented.   Today: improving but still confused with nonfocal neuro exam. Wife and dtr agrees.       -tx hyperglycemia  -follow lytes  -tx possible UTI.   -delirium protocol  -mobilize  -stat head ct today negative (had some new slurring of speech but not persistent and likely 2/2 delirium and fatigue with underlying  dementia  -will be slow to resolve. Discussed with family     Severe malnutrition   -Plus2 Shakes between meals  -ordered daily Thera-Vit-M    abd pain; this afternoon. Now resolved. Nl abd exam this afternoon. Follow      Failure to thrive/Anorexia  - consider possible persisting urinary tract infection  -abx emperically- presents on abx  -tx hyperglycemia  - nutrition consult  -albumin  -PT, OT  -likely needs TCU. Discussed with wife today  -     Dehydration:   2/2 to hyperglycemia, poor po intake   -ivfs- may decrease rate  - bmp this am, now and in am  Control BS     Anemia:  Low MCV.  On iron. Baseline hb in the 9-10 range. Recent GI bleed with Hb as low as 7 range in 2/2018.    Hb down with fluids - likely at baseline. Recent disempaction on 4/15 without bloody stool  Ferritin low in 20 range.   Colonoscopy 3/2018:   Findings:       Seven large localized angiodysplastic lesions without bleeding were        found in the ascending colon and in the cecum. Coagulation for bleeding        prevention using argon plasma at 1.2 liters/minute and 45 garcia was        successful. To prevent bleeding post-intervention, two hemostatic clips        were successfully placed. The largest avm was injected with 2 cc        1:10,000 epinephrine. There was no bleeding at the end of the procedure.       Scattered small and large-mouthed diverticula were found in the colon.    Impressions/Post-Op Diagnosis:       - Seven non-bleeding colonic angiodysplastic lesions. Treated with argon        plasma coagulation (APC). Clips were placed.       - Diverticulosis.       - No specimens collected.  Does not appear to be actively bleeding  Hb stable today in 10 range. Discussed with family  Daily cbc  Evaluate further with anemia panel. No active bleeding.    Stopped lovenox  -cont outpatient iron      Hypernatremia: 2/2 dehydration. Na 144 on presentation. Increased to 151 post fluid bolus. Calculated free water deficit is 2.7 liters.  Replace 1/2 free water in first 24 hours. 1/2 NS given hyperglycemia,     Resolved after correcting for elevated BS.   Stop fluids  Am bmp  Push free fluids, discussed with RN    Grade 1 pressure ulcer: coccyx. present on admission.  Wound care consulted and following.  Topical covering.       HTN: decent control. change from iv to po metoprolol    DVT Prophylaxis: mechanical prophylaxis. Added lovenox but stop now given low mcv Hb and hb drop.     Code Status: Full Code- discussed with pt wife     Disposition: Expected discharge in 1 days    Naga Denney MD  Text Page  (7am to 6pm)  Interval History   Taking good po  No new issues  Ate well last night and this am.         -Data reviewed today: I reviewed all new labs and imaging results over the last 24 hours.    Physical Exam   Temp: 97.5  F (36.4  C) Temp src: Oral BP: 157/83 Pulse: 69 Heart Rate: 71 Resp: 18 SpO2: 97 % O2 Device: None (Room air)    Vitals:    04/15/18 0656 04/16/18 0630 04/17/18 0500   Weight: 58.9 kg (129 lb 13.6 oz) 58.9 kg (129 lb 13.6 oz) 64.4 kg (141 lb 15.6 oz)     Vital Signs with Ranges  Temp:  [97.5  F (36.4  C)-98.5  F (36.9  C)] 97.5  F (36.4  C)  Pulse:  [69-77] 69  Heart Rate:  [66-78] 71  Resp:  [16-20] 18  BP: (106-157)/(54-91) 157/83  SpO2:  [94 %-98 %] 97 %  I/O last 3 completed shifts:  In: 2516 [P.O.:600; I.V.:1916]  Out: 210 [Urine:210]    Constitutional: nad, appears comfortable, nontoxic  Eyes: eomi, perrla  HEENT: nl op,dry MM  Respiratory: cTAB  Cardiovascular: RRR, no r/g/m  Chest: pacemaker Left upper chest  GI: soft, NT ND, no hepatosplenomegaly, no cva tenderness (am and afternoon exam)  Skin: no rash, echymosis on knees, grade 1 pressure ulcer coccyx present on admission. Slightly redness, no purulence  Musculoskeletal: no focal swelling or redness or pain with rom  Neurologic: disoriented to place, year, date. Oriented to name and knows in hospital today but over all his mentation is more clear.  Speech clear  but then does have clear slurred speech at times. Slight asymmetry to face.  Mentasta. Much  less tremor of head and arms since admission. , strength 5/5 extrem X 4.   Psychiatric: calm, cooperative. No agitation    Medications     0.45% sodium chloride + KCl 20 mEq/L 75 mL/hr at 04/17/18 1024       amoxicillin  875 mg Oral Q12H BRIANA     insulin aspart   Subcutaneous TID w/meals     tamsulosin  0.4 mg Oral Daily     multivitamin, therapeutic with minerals  1 tablet Oral Daily     metoprolol tartrate  50 mg Oral BID     insulin aspart  1-7 Units Subcutaneous TID AC     insulin aspart  1-5 Units Subcutaneous At Bedtime     fluticasone-vilanterol  1 puff Inhalation Daily       Data     Recent Labs  Lab 04/17/18  0840 04/16/18  1610 04/16/18  0730  04/15/18  1025 04/14/18  1602 04/14/18  1020   WBC 7.6  --  7.0  --  9.9 10.6 13.2*   HGB 10.8*  --  10.1*  --  12.2* 11.4* 12.4*   MCV 77*  --  78  --  79 78 77*     --  263  --  337 352 400    140 143  < > 145* 151* 144   POTASSIUM 3.9 3.8 3.8  < > 3.6 3.5 4.2   CHLORIDE 109 109 112*  < > 113* 118* 110*   CO2 24 23 23  < > 22 24 22   BUN 15 18 18  < > 16 18 20   CR 1.06 1.04 1.08  < > 1.16 1.18 1.33*   ANIONGAP 7 8 8  < > 10 9 12   GURU 8.5 8.1* 8.1*  < > 8.6 8.5 9.3   * 237* 209*  < > 184* 242* 439*   ALBUMIN  --   --   --   --  2.9* 2.8* 3.1*   PROTTOTAL  --   --   --   --  7.4 6.8 7.7   BILITOTAL  --   --   --   --  0.7 0.9 1.0   ALKPHOS  --   --   --   --  142 146 165*   ALT  --   --   --   --  32 33 40   AST  --   --   --   --  20 12 11   TROPI  --   --   --   --   --   --  <0.015   < > = values in this interval not displayed.    Imaging:   No results found for this or any previous visit (from the past 24 hour(s)).

## 2018-04-17 NOTE — PROGRESS NOTES
04/17/18 0935   Quick Adds   Type of Visit Initial Occupational Therapy Evaluation   Living Environment   Lives With spouse   Living Arrangements house   Home Accessibility bed not on first floor   Number of Stairs to Enter Home 4   Number of Stairs Within Home 12   Stair Railings at Home inside, present on right side   Transportation Available family or friend will provide   Self-Care   Dominant Hand right   Usual Activity Tolerance moderate   Current Activity Tolerance fair   Equipment Currently Used at Home walker, standard   Functional Level Prior   Ambulation 1-->assistive equipment   Transferring 0-->independent   Toileting 0-->independent   Bathing 0-->independent   Dressing 0-->independent   Eating 0-->independent   Communication 0-->understands/communicates without difficulty   Swallowing 0-->swallows foods/liquids without difficulty   Cognition 0 - no cognition issues reported   Fall history within last six months no   Number of times patient has fallen within last six months 0   Which of the above functional risks had a recent onset or change? ambulation;transferring;toileting;bathing;cognition   Prior Functional Level Comment Pt seemed confused, repots being independent with most things but also wife helps out  (difficult to understand baseline)   General Information   Onset of Illness/Injury or Date of Surgery - Date 04/14/18   Referring Physician Naga Denney MD   Patient/Family Goals Statement pt did not state   Additional Occupational Profile Info/Pertinent History of Current Problem 89 year old male who presents with hx of renal stones, Klebsiella uti, recurrent admissions for cystitis at [facility], DM-2 poorly controlled presented from home with    Precautions/Limitations fall precautions   Cognitive Status Examination   Orientation person;place   Level of Consciousness alert   Able to Follow Commands mild impairment   Cognitive Comment confused, answers did not align with chart    Visual Perception   Visual Perception Wears glasses   Pain Assessment   Patient Currently in Pain No   Range of Motion (ROM)   ROM Comment WNL   Strength   Strength Comments generalized deconditioning   Coordination   Coordination Comments tremor   Mobility   Bed Mobility Bed mobility skill: Sit to supine   Bed Mobility Skill: Sit to Supine   Level of [place]: Sit/Supine minimum assist (75% patients effort)   Physical Assist/Nonphysical Assist: Sit/Supine 1 person assist;verbal cues   Transfer Skill: Sit to Stand   Level of [place]: Sit/Stand minimum assist (75% patients effort)   Physical Assist/Nonphysical Assist: Sit/Stand 1 person assist   Assistive Device for Transfer: Sit/Stand standard walker   Transfer Skill: Toilet Transfer   Level of [place]: Toilet minimum assist (75% patients effort)   Physical Assist/Nonphysical Assist: Toilet verbal cues;1 person assist   Assistive Device standard walker;seat riser;grab bars   Balance   Balance Comments unsteady   Upper Body Dressing   Level of [place]: Dress Upper Body stand-by assist   Lower Body Dressing   Level of [place]: Dress Lower Body minimum assist (75% patients effort)   Toileting   Level of [place]: Toilet minimum assist (75% patients effort)   Grooming   Level of [place]: Grooming minimum assist (75% patients effort)   Eating/Self Feeding   Level of [place]: Eating minimum assist (75% patients effort)   Instrumental Activities of Daily Living (IADL)   Previous Responsibilities medication management   Activities of Daily Living Analysis   Impairments Contributing to Impaired Activities of Daily Living balance impaired;cognition impaired;coordination impaired;strength decreased   General Therapy Interventions   Planned Therapy Interventions ADL retraining;IADL retraining;cognition;strengthening   Clinical Impression   Criteria for Skilled Therapeutic Interventions Met yes, treatment indicated   OT Diagnosis decreased  "independence with ADL/IADLs and functional mobility   Influenced by the following impairments cognition, generalized deconditioning   Assessment of Occupational Performance 3-5 Performance Deficits   Identified Performance Deficits ADLs, IADLs, toilet transfers, functional mobility   Clinical Decision Making (Complexity) Low complexity   Therapy Frequency daily   Predicted Duration of Therapy Intervention (days/wks) 4 days   Anticipated Discharge Disposition Transitional Care Facility   Risks and Benefits of Treatment have been explained. Yes   Patient, Family & other staff in agreement with plan of care Yes   [facility] TM \"6 Clicks\"   2016, Trustees of [facility], under license to [employer].  All rights reserved.   6 Clicks Short Forms Daily Activity Inpatient Short Form   [facility]-PAC  \"6 Clicks\" Daily Activity Inpatient Short Form   1. Putting on and taking off regular lower body clothing? 3 - A Little   2. Bathing (including washing, rinsing, drying)? 2 - A Lot   3. Toileting, which includes using toilet, bedpan or urinal? 2 - A Lot   4. Putting on and taking off regular upper body clothing? 3 - A Little   5. Taking care of personal grooming such as brushing teeth? 3 - A Little   6. Eating meals? 3 - A Little   Daily Activity Raw Score (Score out of 24.Lower scores equate to lower levels of function) 16   Total Evaluation Time   Total Evaluation Time (Minutes) 15     "

## 2018-04-18 ENCOUNTER — APPOINTMENT (OUTPATIENT)
Dept: OCCUPATIONAL THERAPY | Facility: CLINIC | Age: 83
DRG: 637 | End: 2018-04-18
Payer: MEDICARE

## 2018-04-18 PROBLEM — G93.40 ACUTE ENCEPHALOPATHY: Status: ACTIVE | Noted: 2018-04-18

## 2018-04-18 PROBLEM — E87.0 HYPERNATREMIA: Status: ACTIVE | Noted: 2018-04-18

## 2018-04-18 PROBLEM — R30.0 DYSURIA: Status: ACTIVE | Noted: 2018-04-18

## 2018-04-18 PROBLEM — R73.9 HYPERGLYCEMIA: Status: ACTIVE | Noted: 2018-04-18

## 2018-04-18 LAB
ANION GAP SERPL CALCULATED.3IONS-SCNC: 5 MMOL/L (ref 3–14)
BUN SERPL-MCNC: 17 MG/DL (ref 7–30)
CALCIUM SERPL-MCNC: 8.7 MG/DL (ref 8.5–10.1)
CHLORIDE SERPL-SCNC: 104 MMOL/L (ref 94–109)
CO2 SERPL-SCNC: 26 MMOL/L (ref 20–32)
CREAT SERPL-MCNC: 1.11 MG/DL (ref 0.66–1.25)
ERYTHROCYTE [DISTWIDTH] IN BLOOD BY AUTOMATED COUNT: 21.7 % (ref 10–15)
GFR SERPL CREATININE-BSD FRML MDRD: 62 ML/MIN/1.7M2
GLUCOSE BLDC GLUCOMTR-MCNC: 146 MG/DL (ref 70–99)
GLUCOSE BLDC GLUCOMTR-MCNC: 153 MG/DL (ref 70–99)
GLUCOSE BLDC GLUCOMTR-MCNC: 154 MG/DL (ref 70–99)
GLUCOSE BLDC GLUCOMTR-MCNC: 176 MG/DL (ref 70–99)
GLUCOSE BLDC GLUCOMTR-MCNC: 255 MG/DL (ref 70–99)
GLUCOSE SERPL-MCNC: 263 MG/DL (ref 70–99)
HCT VFR BLD AUTO: 36.4 % (ref 40–53)
HGB BLD-MCNC: 11.4 G/DL (ref 13.3–17.7)
MCH RBC QN AUTO: 24.2 PG (ref 26.5–33)
MCHC RBC AUTO-ENTMCNC: 31.3 G/DL (ref 31.5–36.5)
MCV RBC AUTO: 77 FL (ref 78–100)
PLATELET # BLD AUTO: 274 10E9/L (ref 150–450)
POTASSIUM SERPL-SCNC: 4.1 MMOL/L (ref 3.4–5.3)
RBC # BLD AUTO: 4.71 10E12/L (ref 4.4–5.9)
SODIUM SERPL-SCNC: 135 MMOL/L (ref 133–144)
WBC # BLD AUTO: 7 10E9/L (ref 4–11)

## 2018-04-18 PROCEDURE — 40000133 ZZH STATISTIC OT WARD VISIT

## 2018-04-18 PROCEDURE — 80048 BASIC METABOLIC PNL TOTAL CA: CPT | Performed by: INTERNAL MEDICINE

## 2018-04-18 PROCEDURE — 25000132 ZZH RX MED GY IP 250 OP 250 PS 637: Mod: GY | Performed by: UROLOGY

## 2018-04-18 PROCEDURE — 12000000 ZZH R&B MED SURG/OB

## 2018-04-18 PROCEDURE — 99207 ZZC CDG-MDM COMPONENT: MEETS LOW - DOWN CODED: CPT | Performed by: INTERNAL MEDICINE

## 2018-04-18 PROCEDURE — 36415 COLL VENOUS BLD VENIPUNCTURE: CPT | Performed by: INTERNAL MEDICINE

## 2018-04-18 PROCEDURE — 00000146 ZZHCL STATISTIC GLUCOSE BY METER IP

## 2018-04-18 PROCEDURE — 97535 SELF CARE MNGMENT TRAINING: CPT | Mod: GO

## 2018-04-18 PROCEDURE — 25000131 ZZH RX MED GY IP 250 OP 636 PS 637: Mod: GY | Performed by: INTERNAL MEDICINE

## 2018-04-18 PROCEDURE — 99232 SBSQ HOSP IP/OBS MODERATE 35: CPT | Performed by: INTERNAL MEDICINE

## 2018-04-18 PROCEDURE — 25000131 ZZH RX MED GY IP 250 OP 636 PS 637: Mod: GY

## 2018-04-18 PROCEDURE — A9270 NON-COVERED ITEM OR SERVICE: HCPCS | Mod: GY | Performed by: INTERNAL MEDICINE

## 2018-04-18 PROCEDURE — 99221 1ST HOSP IP/OBS SF/LOW 40: CPT | Performed by: NURSE PRACTITIONER

## 2018-04-18 PROCEDURE — 85027 COMPLETE CBC AUTOMATED: CPT | Performed by: INTERNAL MEDICINE

## 2018-04-18 PROCEDURE — 25000132 ZZH RX MED GY IP 250 OP 250 PS 637: Mod: GY | Performed by: INTERNAL MEDICINE

## 2018-04-18 RX ORDER — TAMSULOSIN HYDROCHLORIDE 0.4 MG/1
0.4 CAPSULE ORAL DAILY
Qty: 60 CAPSULE | Refills: 0 | Status: SHIPPED | OUTPATIENT
Start: 2018-04-19

## 2018-04-18 RX ORDER — MULTIPLE VITAMINS W/ MINERALS TAB 9MG-400MCG
1 TAB ORAL DAILY
Qty: 30 EACH | Refills: 0 | Status: SHIPPED | OUTPATIENT
Start: 2018-04-19

## 2018-04-18 RX ADMIN — MICONAZOLE NITRATE: 2 POWDER TOPICAL at 20:27

## 2018-04-18 RX ADMIN — INSULIN ASPART 3 UNITS: 100 INJECTION, SOLUTION INTRAVENOUS; SUBCUTANEOUS at 12:22

## 2018-04-18 RX ADMIN — MULTIPLE VITAMINS W/ MINERALS TAB 1 TABLET: TAB at 08:17

## 2018-04-18 RX ADMIN — METOPROLOL TARTRATE 50 MG: 50 TABLET ORAL at 20:25

## 2018-04-18 RX ADMIN — TAMSULOSIN HYDROCHLORIDE 0.4 MG: 0.4 CAPSULE ORAL at 08:17

## 2018-04-18 RX ADMIN — INSULIN GLARGINE 13 UNITS: 100 INJECTION, SOLUTION SUBCUTANEOUS at 21:50

## 2018-04-18 RX ADMIN — INSULIN ASPART 1 UNITS: 100 INJECTION, SOLUTION INTRAVENOUS; SUBCUTANEOUS at 18:56

## 2018-04-18 RX ADMIN — FLUTICASONE FUROATE AND VILANTEROL TRIFENATATE 1 PUFF: 200; 25 POWDER RESPIRATORY (INHALATION) at 08:56

## 2018-04-18 RX ADMIN — METOPROLOL TARTRATE 50 MG: 50 TABLET ORAL at 08:17

## 2018-04-18 RX ADMIN — AMOXICILLIN 875 MG: 875 TABLET, COATED ORAL at 20:25

## 2018-04-18 RX ADMIN — AMOXICILLIN 875 MG: 875 TABLET, COATED ORAL at 08:17

## 2018-04-18 RX ADMIN — INSULIN ASPART 1 UNITS: 100 INJECTION, SOLUTION INTRAVENOUS; SUBCUTANEOUS at 08:53

## 2018-04-18 NOTE — PROGRESS NOTES
UROLOGY     No interval changes   Resting comfortably, reports dysuria and frequency have resolved; urine incontinent   Vitals and labs reviewed and WNL     A/P: urinary frequency, dysuria--resolving; non obstructing upper tract stone--following   Continue flomax   Abx per hospital team   Follow up with Dr. Lambert as outpatient   Probable discharge home today     Tana Dahl PA-C  [facility]  [address] [place] [id number]  [phone]  [email or web address]  Text Page (7am to 5pm)

## 2018-04-18 NOTE — CONSULTS
[facility]    Neurology Consultation Note     Scott Alexander MD MRN# [id number]   YOB: 1925 Age: 89 year old    Code Status:Full Code   Date of Admission: 4/14/2018  Date of Consult: 04/18/2018    _________________________________   Primary Care Physician   Oneal Liu      ______________________________________________         Assessment & Plan     Reason for consult: I was asked by Dr. Denney to evaluate this patient for encephalopathy      ______________________________________________  #. (G93.41) Metabolic encephalopathy  --CT head negative for acute abnormality  -----moderate atrophy noted with small vessel disease  --unable to obtain MRI due to pacemaker  --daily improvement, still feels foggy, but able to converse well and understands the situation.   -----due to multiple hospitalizations and repeat infection, likely will take longer to clear fully  -----suspect he will continue to improve  #. (R73.9) Hyperglycemia  --management per primary service  -----glucose improving  #. (E86.0) Dehydration  --management per primary service  ---hypernatremic on admission, has resolved  ---renal function improved  #. DVT Prophylaxis  --per primary service  #. PT/OT/Speech  --Start evaluations  #. Nutrition / GI Prophylaxis  --Per recommendations of speech therapy      #. Code Status: Full Code      Chief Complaint   ______________________________________________  Encephalopathy   History is obtained from the patient and electronic health record    History of Present Illness   ______________________________________________  Scott Alexander MD is a 89 year old male who presented on 4/14/18 with weakness and poor appetite for several days. Recent hospitalizations in February and March 2018 for ureteral stones and pyelonephritis. Had some slurring of words on admission. CT head negative for acute abnormality, unable to get MRI due to pacemaker. Suspected encephalopathy secondary  to dehydration, UTI, hyperglycemia. 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    On exam, disoriented to date, otherwise conversing appropriately and knows why he is in the hospital. He feels like he has been clearing by the day, but still feels slightly foggy in the head. Nursing notes would agree that he is improving steadily. He has no focal complaints at this time. Suspect multiple hospitalizations and repeat UTI were the cause of confusion. With multiple hospitalizations it would be suspected that it will take him longer to clear completely.     Past Medical History    ______________________________________________  Past Medical History:   Diagnosis Date     Benign prostatic hypertrophy without urinary obstruction      Cardiac pacemaker      Cardiomyopathy      Cardiovascular disease      CHF (congestive heart failure) (H)     EF 30%     DM (diabetes mellitus) with complications (H)      Fatigue      History of angina pectoris      History of gastrointestinal bleeding      Hyperlipidemia LDL goal <70      Left bundle branch block      Other urinary incontinence 5/6/2012     Prostate cancers      Shortness of breath      Tremor, essential      Vitamin D deficiencies     chronic     Past Surgical History   ______________________________________________  Past Surgical History:   Procedure Laterality Date     CATARACT IOL, RT/LT Left 08/28/14     CATARACT IOL, RT/LT Right      IMPLANT PACEMAKER       KNEE SURGERY       Prior to Admission Medications   ______________________________________________  Prior to Admission Medications   Prescriptions Last Dose Informant Patient Reported? Taking?   CEFUROXIME AXETIL PO  Pharmacy Yes Yes   Sig: Take 250 mg by mouth 2 times daily    FLUCONAZOLE PO  Pharmacy Yes No   Sig: Take 200 mg by mouth daily    METOPROLOL TARTRATE PO  Pharmacy Yes Yes   Sig: Take 50 mg by mouth 2 times daily   POTASSIUM CITRATE PO  Pharmacy Yes Yes   Sig: Take 10  mEq by mouth 3 times daily (with meals)    budesonide-formoterol (SYMBICORT) 160-4.5 MCG/ACT Inhaler  Pharmacy Yes Yes   Sig: Inhale 2 puffs into the lungs 2 times daily   ipratropium - albuterol 0.5 mg/2.5 mg/3 mL (DUONEB) 0.5-2.5 (3) MG/3ML neb solution  Pharmacy Yes No   Sig: Take 1 vial by nebulization every 6 hours as needed for shortness of breath / dyspnea or wheezing   rosuvastatin (CRESTOR) 10 MG tablet 4/13/2018 at pm Pharmacy Yes Yes   Sig: Take 10 mg by mouth daily   sitagliptin (JANUVIA) 100 MG tablet  Pharmacy Yes No   Sig: Take 100 mg by mouth daily   triamcinolone (KENALOG) 0.025 % ointment  Pharmacy Yes Yes   Sig: Apply topically 2 times daily      Facility-Administered Medications: None     Allergies   Allergies   Allergen Reactions     Metformin Other (See Comments)     Metabolic acidosis     Diatrizoate Other (See Comments)     Ceftriaxone Diarrhea     severe       Social History   ______________________________________________  Social History     Social History     Marital status:      Spouse name: N/A     Number of children: N/A     Years of education: N/A     Social History Main Topics     Smoking status: Never Smoker     Smokeless tobacco: Never Used     Alcohol use 1.0 oz/week     2 drink(s) per week     Drug use: None     Sexual activity: Not Asked     Other Topics Concern     None     Social History Narrative       Family History   ______________________________________________  Family History   Problem Relation Age of Onset     DIABETES Brother      Glaucoma No family hx of        Review of Systems   ______________________________________________  A comprehensive review of  10 systems was performed and found to be negative except as described in this note  CONSTITUTIONAL: negative for fever, chills, change in weight  INTEGUMENTARY/SKIN: no rash or obvious new lesions  ENT/MOUTH: no sore throat, new sinus pain or nasal drainage, no neck mass noted  RESP: No pleuretic pain, No cough,  "no hemoptysis, No SOB   CV: negative for chest pain, palpitations or peripheral edema  GI: no nausea, vomiting, change in stools  : no dysuria or hematuria  MUSCULOSKELETAL: no myalgias, arthralgias or join efffusion  ENDOCRINE: no history of polyuria, polydyspsia or symptoms of thyroid dysfunction  PSYCHIATRIC: no change in mood stable  LYMPHATIC: no new lymphadenopathy  HEME: no bleeding or easy bruisability  NEURO: see HPI    Physical Exam   ______________________________________________  Weight:142 lbs 3.15 oz; Height:5' 8\"  Temp: 97.5  F (36.4  C) Temp src: Oral BP: 162/83 Pulse: 74 Heart Rate: 69 Resp: 18 SpO2: 96 % O2 Device: None (Room air)    General Appearance:  No acute distress  Neuro:       Mental Status Exam:   Awake, alert, oriented X2. Speech and language are intact. Mental status is normal       Cranial Nerves:  Pupils 3 mm, reactive. EOMI. Face sensation is normal. Face is symmetric. Tongue and uvula are midline. Other CN are normal           Motor:  Generalized weakness. Tone and bulk are normal           Reflexes:  Normal DTR. Toes downgoing.        Sensory:  Normal to light touch               Coordination:   Intact finger-to-nose        Gait:  Up with assistance  Neck: no nuchal rigidity, normal thyroid. No carotid bruits.    Cardiovascular: Regular rate and rhythm, no m/r/g  Lungs: Clear to auscultation  Abdomen: Soft, not tender, not distended  Extremities: No clubbing, no cyanosis, no edema    Data   ______________________________________________  All Data personally reviewed:       Labs:   CBC RESULTS:     Recent Labs  Lab 04/17/18  0840 04/16/18  0730 04/15/18  1025   WBC 7.6 7.0 9.9   RBC 4.49 4.17* 5.04   HGB 10.8* 10.1* 12.2*   HCT 34.5* 32.5* 39.9*    263 337     Basic Metabolic Panel:   Recent Labs   Lab Test  04/17/18   0840  04/16/18   1610  04/16/18   0730   NA  140  140  143   POTASSIUM  3.9  3.8  3.8   CHLORIDE  109  109  112*   CO2  24  23  23   BUN  15  18  18   CR  " 1.06  1.04  1.08   GLC  121*  237*  209*   GURU  8.5  8.1*  8.1*     Liver panel:  Recent Labs   Lab Test  04/15/18   1025  04/14/18   1602  04/14/18   1020   PROTTOTAL  7.4  6.8  7.7   ALBUMIN  2.9*  2.8*  3.1*   BILITOTAL  0.7  0.9  1.0   ALKPHOS  142  146  165*   AST  20  12  11   ALT  32  33  40     A1C:   Recent Labs   Lab Test  04/14/18   1602   A1C  8.8*     Troponin I:   Recent Labs   Lab Test  04/14/18   1020   TROPI  <0.015     UA Results:  Recent Labs   Lab Test  04/14/18   1035   COLOR  Light Yellow   APPEARANCE  Clear   URINEGLC  >1000*   URINEBILI  Negative   URINEKETONE  40*   SG  1.022   UBLD  Trace*   URINEPH  7.5*   PROTEIN  30*   NITRITE  Negative   LEUKEST  Trace*   RBCU  3*   WBCU  7*     Most Recent 6 Bacteria Isolates From Any Culture (See EPIC Reports for Culture Details):  Recent Labs   Lab Test  04/14/18   1110  04/14/18   1035  04/14/18   1020   CULT  No growth after 4 days  10,000 to 50,000 colonies/mL  Enterococcus faecalis  *  <10,000 colonies/mL  urogenital heena  Susceptibility testing not routinely done    No growth after 4 days        Cardiac US:   --       Neurophysiology:   --       Imaging:   All imaging studies were reviewed personally  CT head 4/14/18:   Diffuse cerebral volume loss and cerebral white matter changes consistent with chronic small vessel ischemic disease. No evidence for acute intracranial pathology.     CT head 4/17/18:  Diffuse cerebral volume loss and cerebral white matter changes consistent with chronic small vessel ischemic disease. No evidence for acute intracranial pathology. No change from the comparison study.        Text Page    Bre Garcia, MSN, FNP-BC, RN CNRN

## 2018-04-18 NOTE — PLAN OF CARE
Problem: Patient Care Overview  Goal: Plan of Care/Patient Progress Review  Pt is A&O x 3, disoriented to times. Calm and cooperative for cares. Confused at times. VSS on RA, denied pain/SOB. Mod CHO diet with fair appetite, ate 50% of dinner.  On SSI and carb count coverage. Up x 1 assist to bathroom, incontinent at times. Offered male external catheter declined. Coccyx dressing CDI.  Anticipated discharge to home tomorrow with RN/PT home cares. Will continue to monitor.

## 2018-04-18 NOTE — CONSULTS
Received RN consult asking if pt's supplements are appropriate for a diabetic. He is receiving Plus2 Shakes which have 80 gm carbs/serving.  Pt's blood sugars ~250 today. Intake at meals has been fair.  On admit pt and wife has reported poor appetite with wt loss.   Pt doesn't care for most nutritional supplements but loves ice cream so that is why we are sending the shakes.    Ary Falcon RD  Pager [phone] (M-F)            [phone] (W/E & Hol)

## 2018-04-18 NOTE — PLAN OF CARE
Problem: Patient Care Overview  Goal: Plan of Care/Patient Progress Review  Outcome: No Change  Confused at times. A&O x2-3, disoriented to time and situation. Restless in bed but cooperative. VPM for safety. VSS on RA, ex BP elevated 140s-150s. Denied pain. Up with assist x1. Incontinent of urine. Mepilex on reddened area on buttocks, CDI. Neuros stable. Mod carb diet, . Possible discharge today home with home RN/PT.

## 2018-04-18 NOTE — PROGRESS NOTES
SPIRITUAL HEALTH SERVICES Progress Note  [facility]    SH, follow up visit. The patient and daughter requested [employer] from the .  processed  request.      SH remains available upon request.        Lee Harvey  Chaplain Resident

## 2018-04-18 NOTE — PROGRESS NOTES
"Care Transition Initial Assessment -   Reason For Consult: discharge planning  Met with: Spouse, Bessie and spoke with daughter Bessie  Active Problems:    Dehydration       DATA  Lives With: spouse  Living Arrangements: house  Description of Support System: Supportive, Involved  Who is your support system?: Wife, Children  Identified issues/concerns regarding health management:  consult noted for discharge planning. Patient is a 89 year old male anticipated to be ready for D/C today. Therapies recommend a TCU stay. Spoke with daughter Bessie, who lives 10 minutes away from patient. She is an RN at [employer] and states patient would NOT do well in a rehab setting given the staffing ([facility] not RNs for many cares) and the need to wait for a call light to be answered. Bessie states patient would need a home care agency RN to administer insulin shots for 2 weeks. Explained this would likely not be covered under Medicare for that length of time. The typical coverage is for teaching a caregiver to take over this task. Bessie states she is available to assist daily, but spouse Bessie (her step mother) directs the care. She reports patient is rarely left alone and spouseBessie is physically capable of assisting patient with cares and mobility. There is a stair lift, a gait belt, \"bath lift\" and a walker at home.    Met with spouse Bessie who reports the home care agency is [facility]. and they wish to continue with them. Explained the limits of Medicare coverage and the potential they would be billed for extended RN visits. She stated this would be fine. She will transport patient home.   Called [facility] at [phone] and fax [phone]. Spoke with Ghada who confirms they are Medicare certified. She asks for D/C orders and a summary which will be faxed when available. No F2F is needed. Ghada stated they can provide daily visits for up to a week and will follow up with spouse about additional private pay " visits.       Quality Of Family Relationships: supportive, involved  Transportation Available: family or friend will provide  ASSESSMENT  Cognitive Status: Disorientation/Confusion per RN notes  Concerns to be addressed: No other SW or discharge concerns evident. SW will follow to coordinate the D/C   PLAN  Financial costs for the patient includes: Private pay RN visits possible  Patient/family is agreeable to the plan? Yes  Patient/family Goals and Preferences: Home with continued home are and family assistance

## 2018-04-18 NOTE — PLAN OF CARE
Problem: Patient Care Overview  Goal: Plan of Care/Patient Progress Review  Discharge Planner OT   Patient plan for discharge: Pt did not state  Current status: Encouragement provided to participate in therapy. Pt educated on call light. Min A required for functional transfers, VCs for safe hand placement on FWW. Mod A required for lower body dressing task. Mod A to reposition in bed. Pt confused throughout session, requiring VCs to complete tasks.   Barriers to return to prior living situation: Cognition, decreased functional endurance, fatigue   Recommendations for discharge: TCU  Rationale for recommendations: Pt requires assist to complete functional transfers as well as ADL/IADLs, would benefit from further therapy to increase functional endurance        Entered by: Catrina Rain 04/18/2018 10:45 AM

## 2018-04-18 NOTE — PLAN OF CARE
Problem: Patient Care Overview  Goal: Plan of Care/Patient Progress Review  Outcome: No Change  Confused this morning, having difficulty with expressing day and month.  Neurology consulted; orientation was improved and no further orders.  BG's 154, 255 with carb and sliding scale coverage.  Up to chair and bathroom with assist 1/GB/walker.  Denies pain. Incontinent of urine.  Appetite fair; taking supplement.  Red area on buttock stable, blanchable; redness to scrotum with miconazole powder applied.  Potential discharge today to home with home care.  Spouse present at bedside.

## 2018-04-19 VITALS
SYSTOLIC BLOOD PRESSURE: 110 MMHG | WEIGHT: 128.31 LBS | BODY MASS INDEX: 19.45 KG/M2 | HEIGHT: 68 IN | TEMPERATURE: 97.5 F | HEART RATE: 84 BPM | DIASTOLIC BLOOD PRESSURE: 56 MMHG | OXYGEN SATURATION: 92 % | RESPIRATION RATE: 18 BRPM

## 2018-04-19 LAB
GLUCOSE BLDC GLUCOMTR-MCNC: 151 MG/DL (ref 70–99)
GLUCOSE BLDC GLUCOMTR-MCNC: 173 MG/DL (ref 70–99)
GLUCOSE BLDC GLUCOMTR-MCNC: 193 MG/DL (ref 70–99)
GLUCOSE BLDC GLUCOMTR-MCNC: 198 MG/DL (ref 70–99)
PLATELET # BLD AUTO: 251 10E9/L (ref 150–450)

## 2018-04-19 PROCEDURE — 25000132 ZZH RX MED GY IP 250 OP 250 PS 637: Mod: GY | Performed by: INTERNAL MEDICINE

## 2018-04-19 PROCEDURE — 36415 COLL VENOUS BLD VENIPUNCTURE: CPT | Performed by: INTERNAL MEDICINE

## 2018-04-19 PROCEDURE — 00000146 ZZHCL STATISTIC GLUCOSE BY METER IP

## 2018-04-19 PROCEDURE — 99239 HOSP IP/OBS DSCHRG MGMT >30: CPT | Performed by: INTERNAL MEDICINE

## 2018-04-19 PROCEDURE — 25000132 ZZH RX MED GY IP 250 OP 250 PS 637: Mod: GY | Performed by: UROLOGY

## 2018-04-19 PROCEDURE — A9270 NON-COVERED ITEM OR SERVICE: HCPCS | Mod: GY | Performed by: INTERNAL MEDICINE

## 2018-04-19 PROCEDURE — 85049 AUTOMATED PLATELET COUNT: CPT | Performed by: INTERNAL MEDICINE

## 2018-04-19 RX ORDER — AMOXICILLIN 500 MG/1
500 TABLET, FILM COATED ORAL 3 TIMES DAILY
Qty: 6 TABLET | Refills: 0 | Status: SHIPPED | OUTPATIENT
Start: 2018-04-19

## 2018-04-19 RX ORDER — GLUCOSAMINE HCL/CHONDROITIN SU 500-400 MG
CAPSULE ORAL
Qty: 100 EACH | Refills: 3 | Status: SHIPPED | OUTPATIENT
Start: 2018-04-19

## 2018-04-19 RX ORDER — LANCETS
EACH MISCELLANEOUS
Qty: 90 EACH | Refills: 1 | Status: SHIPPED | OUTPATIENT
Start: 2018-04-19

## 2018-04-19 RX ADMIN — AMOXICILLIN 875 MG: 875 TABLET, COATED ORAL at 09:01

## 2018-04-19 RX ADMIN — METOPROLOL TARTRATE 50 MG: 50 TABLET ORAL at 09:01

## 2018-04-19 RX ADMIN — INSULIN ASPART 2 UNITS: 100 INJECTION, SOLUTION INTRAVENOUS; SUBCUTANEOUS at 13:21

## 2018-04-19 RX ADMIN — TAMSULOSIN HYDROCHLORIDE 0.4 MG: 0.4 CAPSULE ORAL at 09:01

## 2018-04-19 RX ADMIN — FLUTICASONE FUROATE AND VILANTEROL TRIFENATATE 1 PUFF: 200; 25 POWDER RESPIRATORY (INHALATION) at 09:03

## 2018-04-19 RX ADMIN — INSULIN ASPART 1 UNITS: 100 INJECTION, SOLUTION INTRAVENOUS; SUBCUTANEOUS at 09:52

## 2018-04-19 NOTE — DISCHARGE INSTRUCTIONS
[facility] to provide home RN and PT. Their phone is [phone].      Instructions on use of Insulin pen:    * Do a fasting blood sugar every morning prior to giving insulin*      1.  Wash hands.  Take cap off and swab top of pen with alcohol to clean  2.  Removed paper from needle and attach needle by screwing on.  3.  Remove the outer cap and the inner blue cap to expose needle.    4.  Dial dose to 2 and push the button on the end to prime needle; you should see it coming out of needle  5.  Dial dose (currently 14 units)  6.  Clean area on abdomen with alcohol wipe (stay away from umbilicus by 2 inches and any bruises or scars)  5.  Insert needle straight into skin and hold needle in.  Push button on end all the way (til it reads 0)      Hold at least 6 seconds then remove.    6. Unscrew needle to remove and dispose in a sharps container.    7. Recap the pen to keep the insulin protected from light.    **CHECK BLOOD SUGAR TWICE A DAY.    **ALWAYS CHECK A MORNING FASTING SUGAR THEN VARY THE OTHER TIMES TO BEFORE LUNCH OR BEFORE DINNER.

## 2018-04-19 NOTE — PROGRESS NOTES
Discharge    Patient discharged to home via own transportation with spouse  Care plan note:  Patient discharged home with home care.  Declined TCU.  Spouse feels confident that she has enough support to take care of him.  Reviewed use of Lantus pen with spouse and she demonstrated giving insulin with step by step instructions at lunch today.  Instructions on pen use and administering insulin given with discharge instructions.  Up with assist of 1/GB/walker; strength is labile, sometimes needing SBA, other times needing more assistance.  Continues to be confused, worse at times; no improvement today.  BG's 151, 198, 193.  Appetite fair.  VSS.  Afebrile.  Continues to be incontinent of urine; on oral antibiotic.      Listed belongings gathered and returned to patient. Yes  Care Plan and Patient education resolved: yes  Prescriptions if needed, hard copies sent with patient  Filled and given to spouse  Home and hospital acquired medications returned to patient: NA  Medication Bin checked and emptied on discharge: yes  Follow up appointment made for patient: spouse will make appt.

## 2018-04-19 NOTE — PLAN OF CARE
Problem: Patient Care Overview  Goal: Plan of Care/Patient Progress Review  Physical Therapy Discharge Summary    Reason for therapy discharge:    Discharged to home with home therapy.    Progress towards therapy goal(s). See goals on Care Plan in [facility] health record for goal details.  Goals not met.  Barriers to achieving goals:   discharge from facility.    Therapy recommendation(s):    Continued therapy is recommended.  Rationale/Recommendations:  Patient would benefit from conitnued skilled PT to improve his independence and activity tolerance. Therapy recommended rehab but patient and wife adamantly preferred discharge to home. .

## 2018-04-19 NOTE — PROGRESS NOTES
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  On admission, pt afebrile,slightly hypertensive. Head cT, cXR both negative. UA shows mild pyuria- pt presents on cefuroxime     /Possible UTI:   UA not have significant pyuria. Presents on diflucan and cefuroxime per his primary ID provider.  Has had some dysuria and flank pain but hx is vague. CT abd without contrast shows 6 mm R renal stone. No ureteral stone or hydronephrosis.  Multiple renal cysts. Slight hematuria.   -hold antifungal    UCx growing 10-50K Enterococcus, sens to ampicllin (started last night given ucx results)- continue ampicillin for limited course  -restart diflucan if yeast on cx  - bld cx- ngtd  - urology associates consult.   -     DM-2/Hyperglycemia  - running in 200 range at home.  Associated dehydration and encephalopathy. Hyperosmolar on admission but BS not high enough to suggest hyperosmolar coma.   Taking januvia 100mg qd at home.  No longer on metformin.    Home regimen not adequate.  He is not a great candidate to continue metformin given his CKD. Januvia is not adequate. Likely not compliant with his dm diet.   His physician partner is his primary care provider. I suggested that it would be prudent to consider an new primary care provider given his current provider is a close friend.     During his stay he has been maintained on daily pm lantus ~13-15 units along with ISS and low dose scheduled prandial aspart. He was started on Ensure with high sugar content which contributed to his hyperglycemia in house and this has been changed to glucerna so BS this evening and tomorrow will be more  accurate      - dm diet- no further sugar based drinks at home  -1 unit per carb novolog- will not dc with this  - started lantus- 13 units qhs for now  - hold januvia- will not dc on this med    Pt should have a simple DM regimen and this is the wish of the family as well. Pt should be be able to be adequately treated with once daily lantus- probably 13-15 units qd.  Informed wife that this can be increased by ~2 units every 3 days if BS remain consistently >150-160. Given age does not need tight control and this is the wish of the family as well. Goal BS ~130-160.  Ordered RN for RN to go over lantus with wife tomorrow on day of discharge. Family state that home care RN will be available daily to given lantus for several weeks and the wife can take on this role     Metabolic Encephalopathy: acute  2/2 to dehydration, possible UTI, hyperglycemia.    Speech improved. Clearly more alert and coherent than on admission and first 48 hours post admission but r remains confused/disoriented. Hx from family suggest preexisting memory problems/cognitive impairment.   He has intermitent us of nonsensical words. R facial droop chronic per wife from prior TIA.   Repeat Head CT on 4/17 not show any new CVA or bleed.    Pt seen by neurology on 4/18 and agree with metabolic encophalopathy.   Informed family that this may take weeks to fully clear. He requires full cares for his ADLs and IADLs. Family is aware and wish to have him home with good home care already in place.    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   Family agrees.   -complete short course of possible UTI  - delirium protocol  -prevent hyperglycemia and dehydration  Avoid sedating medications  Needs another day in hospital- dc on 4/19.     Severe malnutrition   -Plus2 Shakes between  meals  -ordered daily Thera-Vit-M  -Glucerna during stay and at discharge rec'd    Failure to thrive/Anorexia  - consider possible persisting urinary tract infection  -abx emperically given on admission for possible UTI- presents on abx  -tx'd hyperglycemia  - nutrition consult- glucerna initiated  -albumin  -PT, OT  -pt family not want to have patient transferred to Tcu  -MV       Dehydration:   2/2 to hyperglycemia, poor po intake   Resolved with IVFs. IVFs stopped on 4/17.   Bmp on follow without hypernatremia     Anemia:  Low MCV.  On iron. Baseline hb in the 9-10 range. Recent GI bleed with Hb as low as 7 range in 2/2018.    Hb down with fluids - likely at baseline. Recent disempaction on 4/15 without bloody stool  Ferritin low in 20 range.   Colonoscopy 3/2018:   Findings:       Seven large localized angiodysplastic lesions without bleeding were        found in the ascending colon and in the cecum. Coagulation for bleeding        prevention using argon plasma at 1.2 liters/minute and 45 garcia was        successful. To prevent bleeding post-intervention, two hemostatic clips        were successfully placed. The largest avm was injected with 2 cc        1:10,000 epinephrine. There was no bleeding at the end of the procedure.       Scattered small and large-mouthed diverticula were found in the colon.    Impressions/Post-Op Diagnosis:       - Seven non-bleeding colonic angiodysplastic lesions. Treated with argon        plasma coagulation (APC). Clips were placed.       - Diverticulosis.       - No specimens collected.  Does not appear to be actively bleeding  Hb stable today in 10-11 range during his hospitalization following drop with IV hydration. Discussed with family  Anemia panel obtained. Pt has known hx of GI bleed with recent colonoscopy - see above. No active bleeding.    Stopped lovenox for dvt proph  -cont outpatient iron as his ferritin is only 26. Consider increase dose to bid    Enterococcus uti:  unclear if uti vs colonization. Complete two more days of abx for total of 5 days.      Hypernatremia: 2/2 dehydration. Na 144 on presentation. Increased to 151 post fluid bolus. Calculated free water deficit is 2.7 liters. Replace 1/2 free water in first 24 hours. 1/2 NS given hyperglycemia,     Resolved after correcting for elevated BS.   Stopped  Fluid and follow bmp indicates nl Na off fluids  Push free fluids, discussed with RN  He is eating fairly well.     Grade 1 pressure ulcer: coccyx. present on admission.  Wound care consulted and following.  Topical covering.       HTN: decent control. change from iv to po metoprolol    DVT Prophylaxis: mechanical prophylaxis. Added lovenox but stop now given low mcv Hb and hb drop.     Code Status: Full Code- discussed with pt wife     Disposition: Expected discharge tomorrow.   I have sent some of his new meds to their pharmacy.   lantus needs to be ordered  Pt's dtr is an RN.   Wife seems competentn and very engaged  Home with extensive home care already in place  They absolutely decline transfer to TCU. Wife is aware of the level of care he needs  Pt has apparently been seeing patients up until recently and wife informed that he will need cognitive testing     See SW note from 4/18 as regarding home care orders- discuss with SW in am    Naga Denney MD  Text Page  (7am to 6pm)  Interval History   Feels he is better  No complaints  No cp,HA, n/v/d/abd pain        -Data reviewed today: I reviewed all new labs and imaging results over the last 24 hours.    Physical Exam   Temp: 97.7  F (36.5  C) Temp src: Oral BP: 168/84 Pulse: 74 Heart Rate: 78 Resp: 18 SpO2: 94 % O2 Device: None (Room air)    Vitals:    04/16/18 0630 04/17/18 0500 04/18/18 0700   Weight: 58.9 kg (129 lb 13.6 oz) 64.4 kg (141 lb 15.6 oz) 64.5 kg (142 lb 3.2 oz)     Vital Signs with Ranges  Temp:  [97.5  F (36.4  C)-98.2  F (36.8  C)] 97.7  F (36.5  C)  Pulse:  [74] 74  Heart Rate:  [69-78]  78  Resp:  [18-20] 18  BP: (132-168)/(55-84) 168/84  SpO2:  [94 %-96 %] 94 %  I/O last 3 completed shifts:  In: 770 [P.O.:770]  Out: 100 [Urine:100]    Constitutional: nad, appears comfortable, nontoxic  Eyes: eomi, perrla  HEENT: nl op,dry MM  Respiratory: cTAB  Cardiovascular: RRR, no r/g/m  Chest: pacemaker Left upper chest  GI: soft, NT ND, no hepatosplenomegaly, no cva tenderness (am and afternoon exam)  Skin: no rash, echymosis on knees, grade 1 pressure ulcer coccyx present on admission. Slightly redness, no purulence  Musculoskeletal: no focal swelling or redness or pain with rom  Neurologic: remains confused (would repeat the same response to different questions this morning (not doing this this afternoon). Clearly improved from admission but has stabilized in the last 48 hours without clear improvement. Remains disoriented to place, year, date. Oriented to name and knows in hospital. over all his mentation is more clear than on admission.  Speech clear today (was having intermittent slurred words or used nonsensical words at time during speech yesterday). Slight asymmetry to face (right facial droop- chronic per wife from prior TIA).  [place]. Much  less tremor of head and arms since admission. strength 5/5 extrem X 4.   Psychiatric: calm, cooperative. No agitation    Medications       amoxicillin  875 mg Oral Q12H BRIANA     fluticasone-vilanterol  1 puff Inhalation Daily     insulin aspart  1-7 Units Subcutaneous TID AC     insulin aspart  1-5 Units Subcutaneous At Bedtime     insulin aspart   Subcutaneous TID w/meals     insulin glargine  13 Units Subcutaneous At Bedtime     metoprolol tartrate  50 mg Oral BID     multivitamin, therapeutic with minerals  1 tablet Oral Daily     tamsulosin  0.4 mg Oral Daily       Data     Recent Labs  Lab 04/18/18  0905 04/17/18  0840 04/16/18  1610 04/16/18  0730  04/15/18  1025 04/14/18  1602 04/14/18  1020   WBC 7.0 7.6  --  7.0  --  9.9 10.6 13.2*   HGB 11.4* 10.8*  --   10.1*  --  12.2* 11.4* 12.4*   MCV 77* 77*  --  78  --  79 78 77*    278  --  263  --  337 352 400    140 140 143  < > 145* 151* 144   POTASSIUM 4.1 3.9 3.8 3.8  < > 3.6 3.5 4.2   CHLORIDE 104 109 109 112*  < > 113* 118* 110*   CO2 26 24 23 23  < > 22 24 22   BUN 17 15 18 18  < > 16 18 20   CR 1.11 1.06 1.04 1.08  < > 1.16 1.18 1.33*   ANIONGAP 5 7 8 8  < > 10 9 12   GURU 8.7 8.5 8.1* 8.1*  < > 8.6 8.5 9.3   * 121* 237* 209*  < > 184* 242* 439*   ALBUMIN  --   --   --   --   --  2.9* 2.8* 3.1*   PROTTOTAL  --   --   --   --   --  7.4 6.8 7.7   BILITOTAL  --   --   --   --   --  0.7 0.9 1.0   ALKPHOS  --   --   --   --   --  142 146 165*   ALT  --   --   --   --   --  32 33 40   AST  --   --   --   --   --  20 12 11   TROPI  --   --   --   --   --   --   --  <0.015   < > = values in this interval not displayed.    Imaging:   No results found for this or any previous visit (from the past 24 hour(s)).

## 2018-04-19 NOTE — PLAN OF CARE
Problem: Patient Care Overview  Goal: Plan of Care/Patient Progress Review  Pt is A&O x 3, disoriented to times. Cooperative and sets up bed alarm frequently. Watched TV. VSS on RA, denied pain/SOB. Mod CHO diet with fair appetite, &153. Up x 1 assist to bathroom with walker/GB, incontinent at times.Coccyx dressing CDI. antifungal power applied to groin area. Anticipated discharge to home tomorrow with RN/PT home cares. Will continue to monitor.

## 2018-04-19 NOTE — PLAN OF CARE
"Problem: Patient Care Overview  Goal: Plan of Care/Patient Progress Review  PT: Reviewed chart, spoke with RN, plan is for pt to be discharged to home today. Upon entering room, pt sleeping, spouse at bedside. Discussed with spouse safety concerns with pt going home, especially with stairs to enter home. Provided spouse with education re: stair technique, using gait belt to assist pt with stairs. Offered to practice stairs with pt and spouse, spouse declining intervention stating, \"I think he would rather sleep before going home.\"                  "

## 2018-04-19 NOTE — DISCHARGE SUMMARY
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  On admission, pt afebrile,slightly hypertensive. Head cT, cXR both negative. UA shows mild pyuria- pt presents on cefuroxime      Possible UTI:   UA not have significant pyuria. Presents on diflucan and cefuroxime per his primary ID provider.  Has had some dysuria and flank pain but hx is vague. CT abd without contrast shows 6 mm R renal stone. No ureteral stone or hydronephrosis.  Multiple renal cysts. Slight hematuria.   -antifungal discontinued  -UCx growing 10-50K Enterococcus, sens to ampicllin unclear if he really has a UTI; continue amoxicillin for 2 more days to complete a 5 day course  -urology associates consulted-outpatient follow-up.       DM-2/Hyperglycemia  -Hemoglobin A1c 8.8  -running in 200 range at home.  Associated dehydration and encephalopathy. Hyperosmolar on admission but BS not high enough to suggest hyperosmolar coma.   -Taking januvia 100mg qd at home.  No longer on metformin.    -Home regimen not adequate.  He is not a great candidate to continue metformin given his CKD. Januvia is not adequate. Likely not compliant with his dm diet.   -During his stay he has been maintained on daily pm lantus ~13-15 units along with ISS and low dose scheduled prandial aspart  -Continue Lantus at 14 units every  morning(per wife request )   -hold joyuvia- will not dc on this med  -Pt should have a simple DM regimen and this is the wish of the family as well. Pt should be be able to be adequately treated with once daily lantus- probably 13-15 units qd.  Informed wife that this can be increased by ~2 units every 3 days if BS remain consistently >150-160. Given age does not need tight control and this is the wish of the family as well. Goal BS ~130-160.   -Home health care ordered     Metabolic Encephalopathy: acute  2/2 to dehydration, possible UTI, hyperglycemia.    Speech improved. Clearly more alert and coherent than on admission and first 48 hours post admission but r remains confused/disoriented. Hx from family suggest preexisting memory problems/cognitive impairment.   Repeat Head CT on 4/17 not show any new CVA or bleed.    Pt seen by neurology on 4/18 and agree with metabolic encophalopathy.   Informed family that this may take weeks to fully clear. He requires full cares for his ADLs and IADLs. Family is aware and wish to have him home with good home care already in place.    -Physical therapy recommended discharge to TCU, wife mentioned very clearly that he will not do well at TCU and would rather discharge home.        Discharge Pain Plan:   - Patient currently has NO PAIN and is not being prescribed pain medications on discharge.      Richard Moore MD    Significant Results and Procedures   See below    Pending Results     Unresulted Labs Ordered in the Past 30 Days of this Admission     Date and Time Order Name Status Description    4/14/2018 1042 Blood culture Preliminary     4/14/2018 1005 Blood culture Preliminary           Code Status   Full Code       Primary Care Physician   Oneal Liu    Physical Exam   Temp: 97.5  F (36.4  C) Temp src: Oral BP: 147/76 Pulse: 84 Heart Rate: 70 Resp: 18 SpO2: 92 % O2 Device: None (Room air)      Constitutional: AAOX1, NAD  Respiratory: CTA B/L, Normal  WOB  Cardiovascular: RRR, No murmur  GI: Soft, Non- tender, BS- normoactive  Skin/Integument: Warm and dry, no rashes  Neuro: CN- grossly intact.     Discharge Disposition   Discharged to home  Condition at discharge: Stable    Consultations This Hospital Stay   SWALLOW EVAL SPEECH PATH AT BEDSIDE IP CONSULT  SPIRITUAL HEALTH SERVICES IP CONSULT  PHYSICAL THERAPY ADULT IP CONSULT  OCCUPATIONAL THERAPY ADULT IP CONSULT  NUTRITION SERVICES ADULT IP CONSULT  PHARMACY IP CONSULT  NUTRITION SERVICES ADULT IP CONSULT  UROLOGY IP CONSULT  WOUND OSTOMY CONTINENCE NURSE  IP CONSULT  SPIRITUAL HEALTH SERVICES IP CONSULT  NEUROLOGY IP CONSULT  SOCIAL WORK IP CONSULT  NUTRITION SERVICES ADULT IP CONSULT    Time Spent on this Encounter   IRichard, personally saw the patient today and spent greater than 30 minutes discharging this patient.    Discharge Orders     Home care nursing referral     Home Care OT Referral for Hospital Discharge     Home Care PT Referral for Hospital Discharge     Reason for your hospital stay   Acute encephalopathy     Follow-up and recommended labs and tests   Follow up with primary care provider, Oneal Liu, within 7 days for hospital follow- up.     Activity   Your activity upon discharge: activity as tolerated     Full Code     Diet   Follow this diet upon discharge: Orders Placed This Encounter     Snacks/Supplements Adult: Other - Please comment; Strawberry Plus2 shakes; Between Meals     Moderate Consistent CHO Diet         Discharge Medications   Current Discharge Medication List      START taking these medications    Details   alcohol swab prep pads Use to swab area of injection/elvin as directed.  Qty: 100 each, Refills: 3    Associated Diagnoses: Type 2 diabetes mellitus with hyperosmolarity without coma, without long-term current use of insulin (H)      amoxicillin (AMOXIL) 500 MG tablet Take 1 tablet (500 mg) by mouth 3 times daily  Qty: 6 tablet, Refills: 0    Associated  Diagnoses: Dysuria      blood glucose calibration (NO BRAND SPECIFIED) solution Use to calibrate blood glucose monitor as needed as directed.  Qty: 1 Bottle, Refills: 3    Comments: To accompany: Glucometer per insurance.  Associated Diagnoses: Type 2 diabetes mellitus with hyperosmolarity without coma, without long-term current use of insulin (H)      blood glucose monitoring (NO BRAND SPECIFIED) meter device kit Use to test blood sugar 4 times daily or as directed.  Qty: 1 kit, Refills: 0    Comments: Preferred blood glucose meter OR supplies to accompany: glucometer per insurance  Associated Diagnoses: Type 2 diabetes mellitus with hyperosmolarity without coma, without long-term current use of insulin (H)      blood glucose monitoring (NO BRAND SPECIFIED) test strip Use to test blood sugar 2-3 times daily or as directed.  Qty: 100 strip, Refills: 6    Comments: To accompany: glucometer per insurance.  Associated Diagnoses: Type 2 diabetes mellitus with hyperosmolarity without coma, without long-term current use of insulin (H)      glucagon 1 MG SOLR injection Inject 1 mg Subcutaneous every 15 minutes as needed for low blood sugar (May repeat x 1 only)  Qty: 10 mg, Refills: 0    Comments: Future refills by PCP Dr. Oneal Liu with phone number [phone].  Associated Diagnoses: Type 2 diabetes mellitus with hyperosmolarity without coma, without long-term current use of insulin (H)      insulin glargine (LANTUS) 100 UNIT/ML injection Inject 14 Units Subcutaneous every morning  Qty: 3 mL, Refills: 0    Associated Diagnoses: Type 2 diabetes mellitus with hyperosmolarity without coma, without long-term current use of insulin (H)      multivitamin, therapeutic with minerals (THERA-VIT-M) TABS tablet Take 1 tablet by mouth daily  Qty: 30 each, Refills: 0    Associated Diagnoses: Type 2 diabetes mellitus with hyperosmolarity without coma, without long-term current use of insulin (H)      tamsulosin (FLOMAX) 0.4 MG  capsule Take 1 capsule (0.4 mg) by mouth daily  Qty: 60 capsule, Refills: 0    Comments: Future refills by PCP Dr. Oneal Liu with phone number [phone].  Associated Diagnoses: Benign prostatic hyperplasia with lower urinary tract symptoms, symptom details unspecified      thin (NO BRAND SPECIFIED) lancets Use with lanceting device.  Qty: 90 each, Refills: 1    Comments: To accompany: per insurance.  Associated Diagnoses: Type 2 diabetes mellitus with hyperosmolarity without coma, without long-term current use of insulin (H)         CONTINUE these medications which have NOT CHANGED    Details   budesonide-formoterol (SYMBICORT) 160-4.5 MCG/ACT Inhaler Inhale 2 puffs into the lungs 2 times daily      ipratropium - albuterol 0.5 mg/2.5 mg/3 mL (DUONEB) 0.5-2.5 (3) MG/3ML neb solution Take 1 vial by nebulization every 6 hours as needed for shortness of breath / dyspnea or wheezing      METOPROLOL TARTRATE PO Take 50 mg by mouth 2 times daily      POTASSIUM CITRATE PO Take 10 mEq by mouth 3 times daily (with meals)       rosuvastatin (CRESTOR) 10 MG tablet Take 10 mg by mouth daily      triamcinolone (KENALOG) 0.025 % ointment Apply topically 2 times daily         STOP taking these medications       CEFUROXIME AXETIL PO Comments:   Reason for Stopping:         FLUCONAZOLE PO Comments:   Reason for Stopping:         sitagliptin (JANUVIA) 100 MG tablet Comments:   Reason for Stopping:             Allergies   Allergies   Allergen Reactions     Metformin Other (See Comments)     Metabolic acidosis     Diatrizoate Other (See Comments)     Ceftriaxone Diarrhea     severe     Data   Most Recent 3 CBC's:  Recent Labs   Lab Test  04/19/18   0843  04/18/18   0905  04/17/18   0840  04/16/18   0730   WBC   --   7.0  7.6  7.0   HGB   --   11.4*  10.8*  10.1*   MCV   --   77*  77*  78   PLT  251  274  278  263      Most Recent 3 BMP's:  Recent Labs   Lab Test  04/18/18   0905  04/17/18   0840  04/16/18   1610   NA  135  140   140   POTASSIUM  4.1  3.9  3.8   CHLORIDE  104  109  109   CO2  26  24  23   BUN  17  15  18   CR  1.11  1.06  1.04   ANIONGAP  5  7  8   GURU  8.7  8.5  8.1*   GLC  263*  121*  237*     Most Recent 2 LFT's:  Recent Labs   Lab Test  04/15/18   1025  04/14/18   1602   AST  20  12   ALT  32  33   ALKPHOS  142  146   BILITOTAL  0.7  0.9     Most Recent INR's and Anticoagulation Dosing History:  Anticoagulation Dose History     Recent Dosing and Labs Latest Ref Rng & Units 2/12/2008    INR 0.86 - 1.14 1.02        Most Recent 3 Troponin's:  Recent Labs   Lab Test  04/14/18   1020   TROPI  <0.015     Most Recent Cholesterol Panel:No lab results found.  Most Recent 6 Bacteria Isolates From Any Culture (See EPIC Reports for Culture Details):  Recent Labs   Lab Test  04/14/18   1110  04/14/18   1035  04/14/18   1020   CULT  No growth after 5 days  10,000 to 50,000 colonies/mL  Enterococcus faecalis  *  <10,000 colonies/mL  urogenital heena  Susceptibility testing not routinely done    No growth after 5 days     Most Recent TSH, T4 and A1c Labs:  Recent Labs   Lab Test  04/15/18   1025  04/14/18   1602   TSH  1.68   --    A1C   --   8.8*       Results for orders placed or performed during the hospital encounter of 04/14/18   XR Chest 2 Views    Narrative    CHEST TWO VIEWS  4/14/2018 10:57 AM     HISTORY: Fever, weakness.       Impression    IMPRESSION: PA and lateral views of the chest. Lungs are clear. Heart  is normal in size. No effusions are evident. No pneumothorax.  Implantable cardiac device and three leads appear in good position.    MARC RAZO MD   CT Head w/o Contrast    Narrative    CT OF THE HEAD WITHOUT CONTRAST 4/14/2018 1:06 PM     COMPARISON: None.    HISTORY: Confusion.     TECHNIQUE: 5 mm thick axial CT images of the head were acquired  without IV contrast material.    FINDINGS:  There is moderate diffuse cerebral volume loss. There are  extensive confluent areas of decreased density in the cerebral  white  matter bilaterally that are consistent with sequela of chronic small  vessel ischemic disease.     The ventricles and basal cisterns are within normal limits in  configuration given the degree of cerebral volume loss.  There is no  midline shift. There are no extra-axial fluid collections.     No intracranial hemorrhage, mass or recent infarct.    The visualized paranasal sinuses are well aerated. There is no  mastoiditis. There are no fractures of the visualized bones.       Impression    IMPRESSION:  Diffuse cerebral volume loss and cerebral white matter  changes consistent with chronic small vessel ischemic disease. No  evidence for acute intracranial pathology.     Radiation dose for this scan was reduced using automated exposure  control, adjustment of the mA and/or kV according to patient size, or  iterative reconstruction technique.    CARL SOLITARIO MD   CT Abdomen Pelvis w/o Contrast    Narrative    CT ABDOMEN PELVIS W/O CONTRAST 4/14/2018 4:37 PM    HISTORY: Urinary tract infection.    TECHNIQUE: CT imaging of the abdomen and pelvis is performed without  IV contrast.  Abdominal organs, pelvic organs, bowel, aorta,  retroperitoneum, and abdominal wall are also assessed to the limits of  no IV contrast.  Radiation dose for this scan was reduced using  automated exposure control, adjustment of the mA and/or kV according  to patient size, or iterative reconstruction technique.    COMPARISON: January 22, 2008.    FINDINGS:    Liver: Normal.  Gallbladder:Normal.  Pancreas:Normal.  Spleen:Normal.  Adrenals:Normal.  Ascites:None.    Kidneys/ureters: There are multiple bilateral renal cysts, which have  increased in size compared to prior exam. There is a 6 mm  nonobstructing stone in the mid right kidney. No evidence of  hydronephrosis. No abscess.  Bladder:Normal.  Pelvic free fluid:None.    Bowels: Scattered colonic diverticula without evidence of  diverticulitis.  No evidence of obstruction.  Appendix:  Not visualized.    Abdominal or pelvic lymphadenopathy:None.    Miscellaneous findings: Radiation seeds within prostate.      Impression    IMPRESSION:    1. Nonobstructing 6 mm stone in the mid right kidney. No evidence of  hydronephrosis. No abscess.  2. Multiple bilateral renal cysts which have increased in size  compared to prior exam.  3. Colonic diverticula without evidence of diverticulitis.    LISSETT ANSARI MD   CT Head w/o Contrast    Narrative    CT OF THE HEAD WITHOUT CONTRAST  4/17/2018 12:28 PM     COMPARISON: Head CT 4/14/2018    HISTORY:  Intermittent dysarthria. Rule out recent stroke, bleed.     TECHNIQUE: 5 mm thick axial CT images of the head were acquired  without IV contrast material.    FINDINGS: A focal calcification in the deep white matter of the left  frontal lobe is again noted. There is moderate diffuse cerebral volume  loss. There are extensive confluent areas of decreased density in the  cerebral white matter bilaterally that are consistent with sequela of  chronic small vessel ischemic disease.     The ventricles and basal cisterns are within normal limits in  configuration given the degree of cerebral volume loss.  There is no  midline shift. There are no extra-axial fluid collections.     No intracranial hemorrhage, mass or recent infarct.    The visualized paranasal sinuses are well aerated. There is no  mastoiditis. There are no fractures of the visualized bones.       Impression    IMPRESSION:  Diffuse cerebral volume loss and cerebral white matter  changes consistent with chronic small vessel ischemic disease. No  evidence for acute intracranial pathology. No change from the  comparison study.    Radiation dose for this scan was reduced using automated exposure  control, adjustment of the mA and/or kV according to patient size, or  iterative reconstruction technique.    CARL SOLITARIO MD

## 2018-04-19 NOTE — PLAN OF CARE
Problem: Patient Care Overview  Goal: Plan of Care/Patient Progress Review  Outcome: No Change  Pt A&Ox2-3, disoriented to time/situation. Cooperative overnight, setting off bed alarm occasionally. VSS on RA. Denied pain. Up with assist x1 W/GB. Mod carb diet, . Incontinent of urine, Red area on coccyx, mepilex CDI. Anticipated discharge home today.

## 2018-04-19 NOTE — PLAN OF CARE
Problem: Patient Care Overview  Goal: Plan of Care/Patient Progress Review  OT: pt will be discharging to home today with family assist and home care, see discharge summary, GOALS NOT MET

## 2018-04-20 LAB
BACTERIA SPEC CULT: NO GROWTH
BACTERIA SPEC CULT: NO GROWTH
Lab: NORMAL
Lab: NORMAL
SPECIMEN SOURCE: NORMAL
SPECIMEN SOURCE: NORMAL